# Patient Record
Sex: MALE | Race: WHITE | NOT HISPANIC OR LATINO | Employment: FULL TIME | URBAN - METROPOLITAN AREA
[De-identification: names, ages, dates, MRNs, and addresses within clinical notes are randomized per-mention and may not be internally consistent; named-entity substitution may affect disease eponyms.]

---

## 2023-02-20 PROBLEM — F12.20 TETRAHYDROCANNABINOL (THC) DEPENDENCE (MULTI): Status: ACTIVE | Noted: 2023-02-20

## 2023-02-20 PROBLEM — F32.A DEPRESSION: Status: ACTIVE | Noted: 2023-02-20

## 2023-02-20 PROBLEM — F11.20 OPIOID DEPENDENCE ON AGONIST THERAPY (MULTI): Status: ACTIVE | Noted: 2020-02-12

## 2023-02-20 PROBLEM — G43.909 MIGRAINE HEADACHE: Status: ACTIVE | Noted: 2020-02-12

## 2023-02-20 PROBLEM — F90.0 ATTENTION DEFICIT HYPERACTIVITY DISORDER, PREDOMINANTLY INATTENTIVE TYPE: Status: ACTIVE | Noted: 2020-02-12

## 2023-02-20 PROBLEM — F17.200 NICOTINE DEPENDENCE: Status: ACTIVE | Noted: 2023-02-20

## 2023-02-20 RX ORDER — BUPRENORPHINE AND NALOXONE 8; 2 MG/1; MG/1
FILM, SOLUBLE BUCCAL; SUBLINGUAL
COMMUNITY
Start: 2020-02-12 | End: 2023-03-15 | Stop reason: SDUPTHER

## 2023-02-20 RX ORDER — NALOXONE HYDROCHLORIDE 4 MG/.1ML
4 SPRAY NASAL
COMMUNITY
Start: 2022-01-26

## 2023-02-20 RX ORDER — DEXTROAMPHETAMINE SACCHARATE, AMPHETAMINE ASPARTATE MONOHYDRATE, DEXTROAMPHETAMINE SULFATE AND AMPHETAMINE SULFATE 7.5; 7.5; 7.5; 7.5 MG/1; MG/1; MG/1; MG/1
30 CAPSULE, EXTENDED RELEASE ORAL
COMMUNITY
Start: 2020-08-04

## 2023-02-20 RX ORDER — SUMATRIPTAN SUCCINATE 100 MG/1
100 TABLET ORAL
COMMUNITY
Start: 2014-12-02

## 2023-03-15 ENCOUNTER — OFFICE VISIT (OUTPATIENT)
Dept: PRIMARY CARE | Facility: CLINIC | Age: 33
End: 2023-03-15
Payer: COMMERCIAL

## 2023-03-15 ENCOUNTER — TELEPHONE (OUTPATIENT)
Dept: PRIMARY CARE | Facility: CLINIC | Age: 33
End: 2023-03-15

## 2023-03-15 VITALS
HEIGHT: 73 IN | DIASTOLIC BLOOD PRESSURE: 78 MMHG | WEIGHT: 190 LBS | SYSTOLIC BLOOD PRESSURE: 137 MMHG | BODY MASS INDEX: 25.18 KG/M2

## 2023-03-15 DIAGNOSIS — F11.20 OPIOID DEPENDENCE ON AGONIST THERAPY (MULTI): ICD-10-CM

## 2023-03-15 DIAGNOSIS — F12.20 TETRAHYDROCANNABINOL (THC) DEPENDENCE (MULTI): Primary | ICD-10-CM

## 2023-03-15 LAB
POC AMPHETAMINE: POSITIVE
POC BARBITURATES: NEGATIVE
POC BENZODIAZEPINES: NEGATIVE
POC COCAINE: NEGATIVE
POC METHADONE MANUALLY ENTERED: NEGATIVE
POC METHAMPHETAMINE: NEGATIVE
POC OPIATES: NEGATIVE
POC OXYCODONE: NEGATIVE
POC PHENCYCLIDINE (PCP): NEGATIVE
POC THC: POSITIVE
POC TICYCLIC ANTIDEPRESSANTS (TCA): NEGATIVE

## 2023-03-15 PROCEDURE — 80305 DRUG TEST PRSMV DIR OPT OBS: CPT | Performed by: FAMILY MEDICINE

## 2023-03-15 PROCEDURE — 99214 OFFICE O/P EST MOD 30 MIN: CPT | Performed by: FAMILY MEDICINE

## 2023-03-15 RX ORDER — BUPRENORPHINE AND NALOXONE 8; 2 MG/1; MG/1
FILM, SOLUBLE BUCCAL; SUBLINGUAL
Qty: 12 FILM | Refills: 0 | Status: SHIPPED | OUTPATIENT
Start: 2023-03-15 | End: 2023-03-15 | Stop reason: SDUPTHER

## 2023-03-15 RX ORDER — BUPRENORPHINE AND NALOXONE 8; 2 MG/1; MG/1
FILM, SOLUBLE BUCCAL; SUBLINGUAL
Qty: 12 FILM | Refills: 0 | Status: SHIPPED | OUTPATIENT
Start: 2023-03-15 | End: 2023-04-12 | Stop reason: SDUPTHER

## 2023-03-15 NOTE — PROGRESS NOTES
"Subjective   Patient ID: Juan Pablo Lakhani is a 33 y.o. male who presents for opioid dependency (uds).    HPI   Pt felt functional at daily activities with buprenorphine tx. Pt denies having cravings for opioids or withdrawals symptoms. Pt denies abusing any opioids. Pt has been attending NA/AA meeting or counseling. No eoth use. No HA, constipation, imbalance or confusion. No confusion, sweating, agitation. No trouble falling or staying asleep. Pt would have cravings without buprenorphine treatment. Pt denies depressed mood. No HI/SI.    Review of Systems    Objective   Ht 1.854 m (6' 1\")   Wt 86.2 kg (190 lb)   BMI 25.07 kg/m²     Physical Exam  no acute distress, well groomed, eyes: no sclera icterus, normal pupil size, neck is supple, no exertional respiration, Lungs: CTA bilaterally, heart: RRR, abdomen: soft, no tenderness, BS+, normal balance, good judgment, memory and insight, good mood. Normal affect. No HI/SI or paranoia    Assessment/Plan     Opioid dependence, Pt denies cravings and withdrawals. Pt denies any opioid abuse. Pt felt functional with current buprenorphine tx.    I personally reviewed pt's urine drug screen performed in office today.  The urine drug screen was negative for opiate but + for and amp, thc and bup.   I reviewed pt's updated OARRS report today.  The OARRS report showed that pt filled buprenorphine as prescribed. Caution patient that transmucosal buprenorphine can increase the risk of dental problems, including tooth decay, cavities, oral infections and loss of teeth. Recommend  the pt to see a dentist for dental exam twice a year. Recommend pt to gently rinse teeth and gums with water after the medicine has completely dissolved.  Wait at least 1 hour before brushing teeth. Informed pt of the alternative treatment for opioid dependence such as Probuphine implant, Sublocade injection, Vivitrol shot, oral naltrexone pills and in-patient rehabilitation. Pt prefers to cont current dose " of buprenorphine treatment.   Pt understood to avoid  benzodiazepine if possible and alcohol which can interact with buprenorphine causing respiratory suppression and potential death. Pt understood that buprenorphine is addictive. Caution that buprenorphine diversion is against the law. Recommend pt to keep buprenorphine out of reach of children. Recommend to keep naloxone available in case of opioid OD. Encourage counseling for opioid dependence. Pt should not drive or operate machinery if incoordination or confusion occurs. I have considered the risks of abuse, dependence, addiction and diversion. I believe that it is clinically appropriate for the pt to continue current buprenorphine treatment. RTC as scheduled.   Dc marijuana use

## 2023-04-12 ENCOUNTER — OFFICE VISIT (OUTPATIENT)
Dept: PRIMARY CARE | Facility: CLINIC | Age: 33
End: 2023-04-12
Payer: COMMERCIAL

## 2023-04-12 VITALS — DIASTOLIC BLOOD PRESSURE: 68 MMHG | SYSTOLIC BLOOD PRESSURE: 123 MMHG

## 2023-04-12 DIAGNOSIS — F11.20 OPIOID DEPENDENCE ON AGONIST THERAPY (MULTI): ICD-10-CM

## 2023-04-12 DIAGNOSIS — F12.20 TETRAHYDROCANNABINOL (THC) DEPENDENCE (MULTI): Primary | ICD-10-CM

## 2023-04-12 LAB
POC AMPHETAMINE: NEGATIVE NG/ML
POC BARBITURATES: NEGATIVE NG/ML
POC BENZODIAZEPINES: NEGATIVE NG/ML
POC BUPRENORPHINE URINE: POSITIVE NG/ML
POC COCAINE: NEGATIVE NG/ML
POC MDMA (NG/ML) IN URINE: NEGATIVE NG/ML
POC METHADONE MANUALLY ENTERED: NEGATIVE NG/ML
POC METHAMPHETAMINE: NEGATIVE NG/ML
POC MORPHINE URINE: NEGATIVE NG/ML
POC OPIATES: NEGATIVE NG/ML
POC OXYCODONE: NEGATIVE NG/ML
POC PHENCYCLIDINE (PCP): NEGATIVE NG/ML
POC THC: POSITIVE NG/ML

## 2023-04-12 PROCEDURE — 80346 BENZODIAZEPINES1-12: CPT

## 2023-04-12 PROCEDURE — 80307 DRUG TEST PRSMV CHEM ANLYZR: CPT

## 2023-04-12 PROCEDURE — 80349 CANNABINOIDS NATURAL: CPT

## 2023-04-12 PROCEDURE — 80354 DRUG SCREENING FENTANYL: CPT

## 2023-04-12 PROCEDURE — 80305 DRUG TEST PRSMV DIR OPT OBS: CPT | Performed by: FAMILY MEDICINE

## 2023-04-12 PROCEDURE — 99214 OFFICE O/P EST MOD 30 MIN: CPT | Performed by: FAMILY MEDICINE

## 2023-04-12 PROCEDURE — 80358 DRUG SCREENING METHADONE: CPT

## 2023-04-12 PROCEDURE — 80348 DRUG SCREENING BUPRENORPHINE: CPT

## 2023-04-12 PROCEDURE — 80373 DRUG SCREENING TRAMADOL: CPT

## 2023-04-12 PROCEDURE — 80361 OPIATES 1 OR MORE: CPT

## 2023-04-12 PROCEDURE — 80365 DRUG SCREENING OXYCODONE: CPT

## 2023-04-12 PROCEDURE — 80368 SEDATIVE HYPNOTICS: CPT

## 2023-04-12 RX ORDER — BUPRENORPHINE AND NALOXONE 8; 2 MG/1; MG/1
FILM, SOLUBLE BUCCAL; SUBLINGUAL
Qty: 12 FILM | Refills: 0 | Status: SHIPPED | OUTPATIENT
Start: 2023-04-12 | End: 2023-05-08 | Stop reason: SDUPTHER

## 2023-04-12 NOTE — PROGRESS NOTES
Subjective   Patient ID: Juan Pablo Lakhani is a 33 y.o. male who presents for opioid dependency (Uds, bup, uds2).    HPI   Pt denies having cravings for opioids or withdrawals symptoms. Pt denies abusing any opioids.  Pt felt functional at daily activities with buprenorphine tx. Pt has been attending NA/AA meeting or counseling. Pt denies  eoth use. No HA, constipation, imbalance, confusion, sweating, agitation. No insomnia. Pt would have cravings without buprenorphine treatment. Pt denies depressed mood.     Review of Systems    Objective   There were no vitals taken for this visit.    Physical Exam  Well groomed, eyes: no sclera icterus, normal pupil size, no exertional respiration, Lungs: CTA bilaterally, heart: RRR, abdomen: soft, no tenderness, BS+, normal balance, good judgment, good mood. Normal affect. No HI/SI     Assessment/Plan        Opioid dependence, Pt denies cravings and withdrawals. Pt denies any opioid abuse.   I personally reviewed pt's urine drug screen performed in office today.  The urine drug screen was negative for opiate but + for thc and  bup.   I reviewed pt's updated OARRS report today.  The OARRS report showed no physician shopping. Caution patient that transmucosal buprenorphine can increase the risk of tooth decay, cavities, oral infections and loss of teeth. Alternative treatment for opioid dependence such as Probuphine implant, Sublocade injection are available.  Pt understood that buprenorphine is addictive. Caution that buprenorphine diversion is against the law. Recommend pt to keep buprenorphine out of reach of children. Recommend to keep naloxone available in case of opioid OD. Encourage counseling for opioid dependence. Pt should not drive or operate machinery if incoordination or confusion occurs. I have considered the risks of abuse, dependence, addiction and diversion. I believe that it is clinically appropriate for the pt to continue current buprenorphine treatment. RTC as  scheduled.   Dc smoking marijuana

## 2023-04-14 LAB
6-ACETYLMORPHINE: <25 NG/ML
7-AMINOCLONAZEPAM: <25 NG/ML
ALPHA-HYDROXYALPRAZOLAM: <25 NG/ML
ALPHA-HYDROXYMIDAZOLAM: <25 NG/ML
ALPRAZOLAM: <25 NG/ML
AMPHETAMINE (PRESENCE) IN URINE BY SCREEN METHOD: ABNORMAL
BARBITURATES PRESENCE IN URINE BY SCREEN METHOD: ABNORMAL
CANNABINOIDS IN URINE BY SCREEN METHOD: ABNORMAL
CHLORDIAZEPOXIDE: <25 NG/ML
CLONAZEPAM: <25 NG/ML
COCAINE (PRESENCE) IN URINE BY SCREEN METHOD: ABNORMAL
CODEINE: <50 NG/ML
CREATINE, URINE FOR DRUG: 519.4 MG/DL
DIAZEPAM: <25 NG/ML
DRUG SCREEN COMMENT URINE: ABNORMAL
EDDP: <25 NG/ML
FENTANYL CONFIRMATION, URINE: <2.5 NG/ML
HYDROCODONE: <25 NG/ML
HYDROMORPHONE: <25 NG/ML
LORAZEPAM: <25 NG/ML
METHADONE CONFIRMATION,URINE: <25 NG/ML
MIDAZOLAM: <25 NG/ML
MORPHINE URINE: <50 NG/ML
NORDIAZEPAM: <25 NG/ML
NORFENTANYL: <2.5 NG/ML
NORHYDROCODONE: <25 NG/ML
NOROXYCODONE: <25 NG/ML
O-DESMETHYLTRAMADOL: <50 NG/ML
OXAZEPAM: <25 NG/ML
OXYCODONE: <25 NG/ML
OXYMORPHONE: <25 NG/ML
PHENCYCLIDINE (PRESENCE) IN URINE BY SCREEN METHOD: ABNORMAL
TEMAZEPAM: <25 NG/ML
TRAMADOL: <50 NG/ML
ZOLPIDEM METABOLITE (ZCA): <25 NG/ML
ZOLPIDEM: <25 NG/ML

## 2023-04-17 LAB — 11-NOR-9-CARBOXY-THC, URN, QUANT: >500 NG/ML

## 2023-04-19 LAB
BUPRENORPHINE ,URINE: <2 NG/ML
BUPRENORPHINE GLUC, URINE: 414 NG/ML
NALOXONE, URINE: <100 NG/ML
NORBUPRENORPHINE GLUC,URINE: 305 NG/ML
NORBUPRENORPHINE, URINE: 115 NG/ML

## 2023-05-08 ENCOUNTER — OFFICE VISIT (OUTPATIENT)
Dept: PRIMARY CARE | Facility: CLINIC | Age: 33
End: 2023-05-08
Payer: COMMERCIAL

## 2023-05-08 VITALS — HEART RATE: 76 BPM | DIASTOLIC BLOOD PRESSURE: 77 MMHG | SYSTOLIC BLOOD PRESSURE: 132 MMHG

## 2023-05-08 DIAGNOSIS — F11.20 OPIOID DEPENDENCE ON AGONIST THERAPY (MULTI): ICD-10-CM

## 2023-05-08 DIAGNOSIS — F12.20 TETRAHYDROCANNABINOL (THC) DEPENDENCE (MULTI): Primary | ICD-10-CM

## 2023-05-08 PROCEDURE — 1036F TOBACCO NON-USER: CPT | Performed by: FAMILY MEDICINE

## 2023-05-08 PROCEDURE — 99214 OFFICE O/P EST MOD 30 MIN: CPT | Performed by: FAMILY MEDICINE

## 2023-05-08 PROCEDURE — 80305 DRUG TEST PRSMV DIR OPT OBS: CPT | Performed by: FAMILY MEDICINE

## 2023-05-08 RX ORDER — BUPRENORPHINE AND NALOXONE 8; 2 MG/1; MG/1
FILM, SOLUBLE BUCCAL; SUBLINGUAL
Qty: 12 FILM | Refills: 0 | Status: SHIPPED | OUTPATIENT
Start: 2023-05-08 | End: 2023-06-05 | Stop reason: SDUPTHER

## 2023-05-08 NOTE — PROGRESS NOTES
Subjective   Patient ID: Juan Pablo Lakhani is a 33 y.o. male who presents for OPIOID DEPENDENCY (UDS).    HPI   Pt felt functional at daily activities with buprenorphine tx. Pt denies having cravings for opioids or withdrawals symptoms. Pt denies abusing any opioids. Pt has been attending NA/AA meeting or counseling. No eoth use. No HA, constipation, imbalance or confusion. No trouble falling or staying asleep. Pt would have cravings and withdrawals without buprenorphine treatment. No depressed mood. No HI/SI.     Review of Systems    Objective   /77   Pulse 76     Physical Exam  No acute distress, well groomed, eyes: no sclera icterus, normal pupil size, no exertional respiration, Lungs: CTA bilaterally, heart: RRR, abdomen: soft, no tenderness, BS+, normal balance, good judgment, memory and insight, good mood. Normal affect. No HI/SI or paranoia    Assessment/Plan     Opioid dependence, Pt denies cravings and withdrawals. Pt denies any opioid abuse. Pt felt functional with buprenorphine tx.    I personally reviewed pt's urine drug screen performed in office today.  The urine drug screen was + for thc and amp, buprenorphine but negative for opiate. I reviewed pt's updated OARRS report today.  The OARRS report showed that pt filled buprenorphine as prescribed. Pt has not filled other opioid medications recently. Recommend the pt to see a dentist for dental exam twice a year while on buprenorphine. Recommend pt to gently rinse teeth and gums with water after the medicine has completely dissolved.  Informed pt of the alternative medication treatment for opioid dependence such as Probuphine implant, SUBLOCADE injection, vivitrol shot, oral naltrexone pills. Pt prefers to continue current of buprenorphine treatment. Pt understood to avoid alcohol and benzodiazepine which can interact with buprenorphine causing respiratory suppression and potential death. Pt understood that buprenorphine is addictive. Caution that  buprenorphine diversion is against the law. Recommend pt to keep buprenorphine out of reach of children. Recommend to keep naloxone available in case of opioid OD. Encourage counseling for opioid dependence. Pt should not drive or operate machinery if incoordination or confusion occurs. I have considered the risks of abuse, dependence, addiction and diversion. I believe that it is clinically appropriate for the pt to continue current buprenorphine treatment. RTC as scheduled.   Dc smoking marijuana

## 2023-05-09 ENCOUNTER — TELEPHONE (OUTPATIENT)
Dept: PRIMARY CARE | Facility: CLINIC | Age: 33
End: 2023-05-09

## 2023-05-09 LAB
POC AMPHETAMINE: POSITIVE NG/ML
POC BARBITURATES: NEGATIVE NG/ML
POC BENZODIAZEPINES: NEGATIVE NG/ML
POC BUPRENORPHINE URINE: POSITIVE NG/ML
POC COCAINE: NEGATIVE NG/ML
POC MDMA (NG/ML) IN URINE: NEGATIVE NG/ML
POC METHADONE MANUALLY ENTERED: NEGATIVE NG/ML
POC METHAMPHETAMINE: NEGATIVE NG/ML
POC MORPHINE URINE: NEGATIVE NG/ML
POC OPIATES: NEGATIVE NG/ML
POC OXYCODONE: NEGATIVE NG/ML
POC PHENCYCLIDINE (PCP): NEGATIVE NG/ML
POC THC: POSITIVE NG/ML

## 2023-05-09 NOTE — TELEPHONE ENCOUNTER
Pt called - states that only 12 strips of suboxone were sent to the pharmacy and it should have been 13.

## 2023-06-05 ENCOUNTER — OFFICE VISIT (OUTPATIENT)
Dept: PRIMARY CARE | Facility: CLINIC | Age: 33
End: 2023-06-05
Payer: COMMERCIAL

## 2023-06-05 VITALS — SYSTOLIC BLOOD PRESSURE: 129 MMHG | DIASTOLIC BLOOD PRESSURE: 77 MMHG

## 2023-06-05 DIAGNOSIS — F12.20 TETRAHYDROCANNABINOL (THC) DEPENDENCE (MULTI): ICD-10-CM

## 2023-06-05 DIAGNOSIS — F11.20 OPIOID DEPENDENCE ON AGONIST THERAPY (MULTI): Primary | ICD-10-CM

## 2023-06-05 LAB
POC AMPHETAMINE: POSITIVE NG/ML
POC COCAINE: NEGATIVE NG/ML
POC METHAMPHETAMINE: NEGATIVE NG/ML
POC OPIATES: NEGATIVE NG/ML
POC THC: POSITIVE NG/ML

## 2023-06-05 PROCEDURE — 80305 DRUG TEST PRSMV DIR OPT OBS: CPT | Performed by: FAMILY MEDICINE

## 2023-06-05 PROCEDURE — 99214 OFFICE O/P EST MOD 30 MIN: CPT | Performed by: FAMILY MEDICINE

## 2023-06-05 PROCEDURE — 1036F TOBACCO NON-USER: CPT | Performed by: FAMILY MEDICINE

## 2023-06-05 RX ORDER — BUPRENORPHINE AND NALOXONE 8; 2 MG/1; MG/1
FILM, SOLUBLE BUCCAL; SUBLINGUAL
Qty: 12 FILM | Refills: 0 | Status: SHIPPED | OUTPATIENT
Start: 2023-06-05 | End: 2023-06-30 | Stop reason: SDUPTHER

## 2023-06-05 NOTE — PROGRESS NOTES
Subjective   Patient ID: Juan Pablo Lakhani is a 33 y.o. male who presents for opioid dependence    HPI   Pt stated that current dose of buprenorphine controlled cravings for opioids and withdrawals symptoms well. Pt feels normal and performs daily activities well.  Pt denies abusing any opioids. pt has social support and has been having counseling. pt denies drinking alcohol or taking benzodiazepine. No SE from buprenorphine  such as HA, constipation, imbalance or confusion. Pt sleeps well most night. Pt would have cravings and withdrawals without buprenorphine treatment. Pt denies depressed mood. No HI/SI.     Review of Systems    Objective   There were no vitals taken for this visit.    Physical Exam  A&Ox3, No acute distress, well groomed, eyes: no sclera icterus, no exertional respiration, Lungs: CTA bilaterally, heart: RRR, abdomen: soft, no tenderness, BS+, normal station and gait, good  mood     Assessment/Plan     Opioid dependence, Pt stated that current dose of buprenorphine treatment controlled cravings and withdrawals well. Pt denies any opioid abuse.   I personally reviewed pt's urine drug screen done in office today.  The urine drug screen was negative for opiate but + for thc and amp. I reviewed pt's updated OARRS report today.  The OARRS report showed that pt filled buprenorphine consistently as prescribed. Caution patient that alcohol and benzo should be avoided while on buprenorphine. Pt understood that buprenorphine is addictive. Lending meds is illegal. Recommend pt to lock  buprenorphine in a safe place and  out of reach of children. Recommend to keep naloxone available in case of opioid OD. Cont counseling for opioid dependence. Pt should not drive or operate machinery if incoordination or confusion occurs. I have considered the risks of abuse, dependence, addiction and diversion. I believe that it is clinically appropriate for the pt to continue current buprenorphine treatment. Continue current  dose of buprenorphine. RTC as scheduled  Dc using marijuana

## 2023-06-30 ENCOUNTER — OFFICE VISIT (OUTPATIENT)
Dept: PRIMARY CARE | Facility: CLINIC | Age: 33
End: 2023-06-30
Payer: COMMERCIAL

## 2023-06-30 VITALS — SYSTOLIC BLOOD PRESSURE: 125 MMHG | DIASTOLIC BLOOD PRESSURE: 70 MMHG

## 2023-06-30 DIAGNOSIS — F11.20 OPIOID DEPENDENCE ON AGONIST THERAPY (MULTI): Primary | ICD-10-CM

## 2023-06-30 PROCEDURE — 1036F TOBACCO NON-USER: CPT | Performed by: FAMILY MEDICINE

## 2023-06-30 PROCEDURE — 99213 OFFICE O/P EST LOW 20 MIN: CPT | Performed by: FAMILY MEDICINE

## 2023-06-30 RX ORDER — BUPRENORPHINE AND NALOXONE 8; 2 MG/1; MG/1
FILM, SOLUBLE BUCCAL; SUBLINGUAL
Qty: 12 FILM | Refills: 0 | Status: SHIPPED | OUTPATIENT
Start: 2023-07-01 | End: 2023-07-28 | Stop reason: SDUPTHER

## 2023-06-30 NOTE — PROGRESS NOTES
Subjective   Patient ID: Juan Pablo Lakhani is a 33 y.o. male who presents for Opioid dependence treatment    HPI   Cravings for opioids and withdrawals symptoms were controlled with current dose of buprenorphine tx. Pt denies abusing any opioids. Patient has been attending meeting/counseling as recommended. Pt felt normal and functional with buprenorphine tx. Pt was able to maintain good relationship with other people.  Pt had good family  support. Pt denies drinking eoth. No headache, constipation, imbalance, insomnia, mental cloudiness, sweating, drowsiness or confusion. Good appetite. Pt would have cravings and withdrawals without buprenorphine treatment. pt denies having depressed mood.   Review of Systems    Objective   /70     Physical Exam  No acute distress, well groomed, eyes: normal pupil size, no sclera icterus, Lungs: CTA bilaterally, heart: RRR, abdomen: soft, no tenderness, BS+, no imbalance. Normal mood   Assessment/Plan     Opioid dependence, pt feels functional at daily activities while on buprenorphine treatment. Pt has been attending counseling/NA/AA meeting for opioid dependence. Pt denies any opioid abuse. Cravings and withdrawals were controlled.  reviewed pt's OARRS report today. The OARRS report showed pt filled medications as prescribed.   Inform pt to avoid  alcohol  while on buprenorphine tx. I have considered the risks of abuse, dependence, addiction and diversion. I believe that it is clinically appropriate for the pt to continue current buprenorphine treatment. Continue counseling for opioid dependence. Recommend pt to keep  buprenorphine in a secure place and out of reach of children. Inform pt that lending buprenorphine is illegal. Recommend to keep narcan available in case of opioid OD. Pt should not drive or operate machinery if incoordination or confusion occurs. f/u as scheduled

## 2023-07-28 ENCOUNTER — OFFICE VISIT (OUTPATIENT)
Dept: PRIMARY CARE | Facility: CLINIC | Age: 33
End: 2023-07-28
Payer: COMMERCIAL

## 2023-07-28 VITALS — DIASTOLIC BLOOD PRESSURE: 78 MMHG | SYSTOLIC BLOOD PRESSURE: 135 MMHG | HEART RATE: 82 BPM

## 2023-07-28 DIAGNOSIS — F11.20 OPIOID DEPENDENCE ON AGONIST THERAPY (MULTI): Primary | ICD-10-CM

## 2023-07-28 DIAGNOSIS — F12.20 TETRAHYDROCANNABINOL (THC) DEPENDENCE (MULTI): ICD-10-CM

## 2023-07-28 PROCEDURE — 80305 DRUG TEST PRSMV DIR OPT OBS: CPT | Performed by: FAMILY MEDICINE

## 2023-07-28 PROCEDURE — 99214 OFFICE O/P EST MOD 30 MIN: CPT | Performed by: FAMILY MEDICINE

## 2023-07-28 PROCEDURE — 1036F TOBACCO NON-USER: CPT | Performed by: FAMILY MEDICINE

## 2023-07-28 RX ORDER — BUPRENORPHINE AND NALOXONE 8; 2 MG/1; MG/1
FILM, SOLUBLE BUCCAL; SUBLINGUAL
Qty: 12 FILM | Refills: 0 | Status: SHIPPED | OUTPATIENT
Start: 2023-07-29 | End: 2023-08-25

## 2023-07-28 NOTE — PROGRESS NOTES
Subjective   Patient ID: Juan Pablo Lakhani is a 33 y.o. male who presents for Opioid dependence treatment    HPI   Pt denies cravings for opioids and withdrawals symptoms with current dose of buprenorphine tx. Pt denies abusing any opioids. Patient has been attending meeting/counseling as recommended. Pt felt  functional with buprenorphine tx. Pt had good support. Pt denies drinking eoth. No headache, constipation, insomnia  or confusion. Good appetite. Pt would have cravings and withdrawals without buprenorphine treatment. pt denies having depressed mood.     Review of Systems    Objective   /78   Pulse 82     Physical Exam  No acute distress, well groomed, eyes: normal pupil size,  Lungs: CTA bilaterally, heart: RRR, abdomen: soft, no tenderness, BS+, good balance. good mood     Assessment/Plan        Opioid dependence, Pt denies any opioid abuse. Cravings and withdrawals were controlled.  Pt has been attending counseling/NA/AA meeting for opioid dependence.  I reviewed pt's OARRS report today. The OARRS report showed pt filled medications as prescribed. I reviewed pt's UDS done in office today. It was negative for opioid but + for amp and thc.  Inform pt to avoid  alcohol  while on buprenorphine tx. I have considered the risks of abuse, dependence, addiction and diversion. I believe that it is clinically appropriate for the pt to continue current buprenorphine treatment. Continue counseling for opioid dependence. Inform pt that lending buprenorphine is illegal. Recommend to keep narcan available in case of opioid OD. Pt should not drive or operate machinery if incoordination or confusion occurs. f/u as scheduled  Dc marijuana use

## 2023-08-25 ENCOUNTER — OFFICE VISIT (OUTPATIENT)
Dept: PRIMARY CARE | Facility: CLINIC | Age: 33
End: 2023-08-25
Payer: COMMERCIAL

## 2023-08-25 DIAGNOSIS — F11.20 OPIOID DEPENDENCE ON AGONIST THERAPY (MULTI): Primary | ICD-10-CM

## 2023-08-25 PROCEDURE — 1036F TOBACCO NON-USER: CPT | Performed by: FAMILY MEDICINE

## 2023-08-25 PROCEDURE — 99213 OFFICE O/P EST LOW 20 MIN: CPT | Performed by: FAMILY MEDICINE

## 2023-08-29 VITALS — SYSTOLIC BLOOD PRESSURE: 132 MMHG | DIASTOLIC BLOOD PRESSURE: 76 MMHG

## 2023-08-29 NOTE — PROGRESS NOTES
Subjective   Patient ID: Juan Pablo Lakhani is a 33 y.o. male who presents for Opioid dependence treatment    HPI   Pt stated that current dose of buprenorphine controlled cravings for opioids and withdrawals symptoms well. Pt feels normal and performs daily activities well.  Pt denies abusing any opioids. pt has social support and has been having counseling. pt denies drinking alcohol. No SE from buprenorphine  such as HA, constipation, imbalance or confusion. Pt sleeps well most night. Pt would have cravings and withdrawals without buprenorphine treatment. Pt denies depressed mood. No HI/SI.     Review of Systems    Objective   /76     Physical Exam  No acute distress, well groomed, eyes: no sclera icterus, no exertional respiration, Lungs: CTA bilaterally, heart: RRR, abdomen: soft, no tenderness, BS+, normal station and gait, normal mood and affect. No HI/SI     Assessment/Plan     Opioid dependence, Pt stated that current dose of buprenorphine treatment controlled cravings and withdrawals well. Pt denies any opioid abuse.  Caution patient that alcohol  should be avoided while on buprenorphine. Pt understood that buprenorphine is addictive. Inform pt that lending meds is illegal. Recommend pt to lock  buprenorphine in a safe place and  out of reach of children. Recommend to keep naloxone available in case of opioid OD. Cont counseling for opioid dependence. Pt should not drive or operate machinery if incoordination or confusion occurs. I have considered the risks of abuse, dependence, addiction and diversion. I believe that it is clinically appropriate for the pt to continue current buprenorphine treatment. RTC as scheduled

## 2023-09-22 ENCOUNTER — OFFICE VISIT (OUTPATIENT)
Dept: PRIMARY CARE | Facility: CLINIC | Age: 33
End: 2023-09-22

## 2023-09-22 VITALS — DIASTOLIC BLOOD PRESSURE: 76 MMHG | SYSTOLIC BLOOD PRESSURE: 132 MMHG

## 2023-09-22 DIAGNOSIS — F12.20 TETRAHYDROCANNABINOL (THC) DEPENDENCE (MULTI): ICD-10-CM

## 2023-09-22 DIAGNOSIS — F11.20 OPIOID DEPENDENCE ON AGONIST THERAPY (MULTI): Primary | ICD-10-CM

## 2023-09-22 PROCEDURE — 80305 DRUG TEST PRSMV DIR OPT OBS: CPT | Performed by: FAMILY MEDICINE

## 2023-09-22 PROCEDURE — 1036F TOBACCO NON-USER: CPT | Performed by: FAMILY MEDICINE

## 2023-09-22 PROCEDURE — 99214 OFFICE O/P EST MOD 30 MIN: CPT | Performed by: FAMILY MEDICINE

## 2023-09-22 RX ORDER — BUPRENORPHINE AND NALOXONE 8; 2 MG/1; MG/1
FILM, SOLUBLE BUCCAL; SUBLINGUAL
COMMUNITY
Start: 2023-08-25 | End: 2023-09-22 | Stop reason: SDUPTHER

## 2023-09-22 RX ORDER — BUPRENORPHINE AND NALOXONE 8; 2 MG/1; MG/1
FILM, SOLUBLE BUCCAL; SUBLINGUAL
Qty: 12 FILM | Refills: 0 | Status: SHIPPED | OUTPATIENT
Start: 2023-09-23 | End: 2023-10-20 | Stop reason: SDUPTHER

## 2023-09-22 NOTE — PROGRESS NOTES
Subjective   Patient ID: Juan Pablo Lakhani is a 33 y.o. male who presents for Opioid dependence treatment      HPI   pt denies cravings for opioids and withdrawals symptoms with buprenorphine tx. pt felt functional at daily activities. No headache, confusion, drowsiness, imbalance, insomnia or constipation. Pt denies abusing any opioids.  Pt denies drinking eoth.  pt has been attending counseling and/or NA/AA meeting. Pt has good family support. Pt would have cravings and withdrawals without buprenorphine treatment. pt denies depressed mood, mood swings or HI/SI.     Review of Systems    Objective   /76     Physical Exam  No acute distress, well groomed, eyes: normal pupil size, no sclera icterus, no nasal discharge, no exertional respiration, Lungs: CTA bilaterally, heart: RRR, abdomen: soft, no tenderness, BS+, good balance, good memory and judgment. No depressed mood     Assessment/Plan     Opioid dependence, Pt denies any opioid abuse. Cravings and withdrawals were controlled.     I personally reviewed pt's urine drug screen test today.  The urine drug screen was negative for opiate but + for thc and amp. I reviewed pt's OARRS report today. The OARRS report showed pt filled meds as prescribed.  No physician shopping.   Inform pt that counseling and/or NA/AA meeting are mandatory for the buprenorphine treatment program.  Inform pt that buprenorphine is addictive and that buprenorphine diversion is against the law. Pt was familiar to the signs and symptoms of opioid overdose and will call 911 in an overdose situation. Pt has narcan readily available in case of opioid OD. Pt agreed to ask for a new prescription for narcan upon expiration or use of the old narcan kit. Continue counseling and  buprenorphine tx for opioid dependence. Recommend pt to lock  buprenorphine in a secure  location. I have considered the risks of abuse, dependence, addiction and diversion. I believe that it is clinically appropriate for  the pt to continue current buprenorphine treatment. f/u as scheduled  Dc marijuana use

## 2023-10-20 ENCOUNTER — OFFICE VISIT (OUTPATIENT)
Dept: PRIMARY CARE | Facility: CLINIC | Age: 33
End: 2023-10-20

## 2023-10-20 VITALS — DIASTOLIC BLOOD PRESSURE: 69 MMHG | SYSTOLIC BLOOD PRESSURE: 127 MMHG

## 2023-10-20 DIAGNOSIS — F11.20 OPIOID DEPENDENCE ON AGONIST THERAPY (MULTI): ICD-10-CM

## 2023-10-20 PROCEDURE — 90471 IMMUNIZATION ADMIN: CPT | Performed by: FAMILY MEDICINE

## 2023-10-20 PROCEDURE — 99213 OFFICE O/P EST LOW 20 MIN: CPT | Performed by: FAMILY MEDICINE

## 2023-10-20 PROCEDURE — 90686 IIV4 VACC NO PRSV 0.5 ML IM: CPT | Performed by: FAMILY MEDICINE

## 2023-10-20 PROCEDURE — 1036F TOBACCO NON-USER: CPT | Performed by: FAMILY MEDICINE

## 2023-10-20 RX ORDER — BUPRENORPHINE AND NALOXONE 8; 2 MG/1; MG/1
FILM, SOLUBLE BUCCAL; SUBLINGUAL
Qty: 12 FILM | Refills: 0 | Status: SHIPPED | OUTPATIENT
Start: 2023-10-21 | End: 2023-10-20 | Stop reason: SDUPTHER

## 2023-10-20 RX ORDER — BUPRENORPHINE AND NALOXONE 8; 2 MG/1; MG/1
FILM, SOLUBLE BUCCAL; SUBLINGUAL
Qty: 12 FILM | Refills: 0 | Status: SHIPPED | OUTPATIENT
Start: 2023-10-20 | End: 2023-11-16 | Stop reason: SDUPTHER

## 2023-10-20 NOTE — PROGRESS NOTES
Subjective   Patient ID: Juan Pablo Lakhani is a 33 y.o. male who presents for Opioid dependence treatment    HPI   Pt stated that cravings for opioids and withdrawals symptoms were controlled with buprenorphine. Pt has been taking buprenorphine as instructed. Pt denies abusing any opioids.  Pt sleeps well most nights. pt has been attending counseling and/or NA/AA meeting. Pt denies drinking eoth. Pt denies having HA, constipation, imbalance or confusion. Pt denies mental cloudiness, drowsiness.  Pt felt functional while on current buprenorphine treatment. Pt may abuse opioids again without buprenorphine treatment due to cravings and withdrawals. Pt denies depressed mood or HI/SI.     Review of Systems    Objective   /69     Physical Exam  No acute distress, well groomed, eyes: no sclera icterus, normal pupil size, neck is supple, Lungs: CTA bilaterally, heart: RRR, abdomen: soft, no tenderness, normal BS, good balance, good judgment and insight. Good mood and normal affect. No HI/SI     Assessment/Plan        Opioid dependence, Pt denies any opioid abuse. Cravings and withdrawals were controlled.   I reviewed pt's updated OARRS report today.  The OARRS report showed that pt filled buprenorphine as prescribed. No sign of physician shopping. The patient was informed that Eoth and benzo should be avoided while on buprenorphine. pt understood that diversion of buprenorphine is illegal. Recommend to taper down the dose of buprenorphine when pt feels comfortable. Recommend pt to keep buprenorphine in a secure place and out of reach of children. Recommend to keep naloxone kits available in case of opioid OD. Continue counseling for opioid dependence. Pt should not drive or operate machinery if incoordination or confusion occurs. I have considered the risks of abuse, dependence, addiction and diversion. I believe that it is clinically appropriate for the pt to continue buprenorphine treatment. Pt is familiar to the signs  and symptoms of opioid overdose and pt knows how to use narcan in case of emergency. f/u as scheduled   Patient is not sick today. Patient is not anxious about getting a shot today. Patient has never had Guillain Barré syndrome. Patient has never felt dizzy or faint before, during, or after a shot. Patient has never had a serious reaction to influenza vaccine in the past.  Patient has never had an allergy to an ingredient of influenza vaccine.  Flu shot was given via IM today.

## 2023-11-16 ENCOUNTER — OFFICE VISIT (OUTPATIENT)
Dept: PRIMARY CARE | Facility: CLINIC | Age: 33
End: 2023-11-16

## 2023-11-16 ENCOUNTER — TELEPHONE (OUTPATIENT)
Dept: PRIMARY CARE | Facility: CLINIC | Age: 33
End: 2023-11-16

## 2023-11-16 VITALS — SYSTOLIC BLOOD PRESSURE: 123 MMHG | DIASTOLIC BLOOD PRESSURE: 67 MMHG

## 2023-11-16 DIAGNOSIS — F11.20 OPIOID DEPENDENCE ON AGONIST THERAPY (MULTI): ICD-10-CM

## 2023-11-16 PROCEDURE — 99214 OFFICE O/P EST MOD 30 MIN: CPT | Performed by: FAMILY MEDICINE

## 2023-11-16 PROCEDURE — 1036F TOBACCO NON-USER: CPT | Performed by: FAMILY MEDICINE

## 2023-11-16 PROCEDURE — 80305 DRUG TEST PRSMV DIR OPT OBS: CPT | Performed by: FAMILY MEDICINE

## 2023-11-16 RX ORDER — BUPRENORPHINE AND NALOXONE 8; 2 MG/1; MG/1
FILM, SOLUBLE BUCCAL; SUBLINGUAL
Qty: 12 FILM | Refills: 0 | Status: SHIPPED | OUTPATIENT
Start: 2023-11-16 | End: 2023-12-14 | Stop reason: SDUPTHER

## 2023-11-16 NOTE — PROGRESS NOTES
Subjective   Patient ID: Juan Pablo Lakhani is a 33 y.o. male who presents for Opioid dependence treatment    HPI   Pt performs daily activities well with current buprenorphine treatment.  Cravings for opioids and withdrawals symptoms were controlled.  Pt denies abusing any opioids.Pt denies drinking eoth. Pt has been attending meeting or counseling for opioid dependence. Pt has been taking buprenorphine as dir. Pt denies having headache, constipation, mental cloudiness, fatigue, insomnia, drowsiness. Pt would have cravings and withdrawals without buprenorphine treatment. Pt has had good mood. No HI/SI.     Review of Systems    Objective   /67     Physical Exam  No acute distress, well groomed, eyes: normal pupil size, no sclera icterus, normal respiration effort. Lungs: CTA bilaterally, heart: RRR, abdomen: soft, no tenderness, normal BS, Good mood and normal affect. No HI/SI    Assessment/Plan     Opioid dependence, Pt denies any opioid abuse. Cravings for opioids and withdrawals were controlled.    I personally reviewed pt's urine drug screen test performed in office today.  The urine drug screen was negative for opiate but + for thc and amp. I reviewed pt's updated OARRS report.  The OARRS report showed that pt filled buprenorphine as prescribed. No opioid shopping.   Pt understood to avoid use  Eoth while on buprenorphine due to potential interactions causing death. Inform pt that buprenorphine is addictive. Inform pt that selling or giving away buprenorphine is against the law. Recommend pt to keep buprenorphine out of reach of children. Recommend to keep naloxone kits available in case of opioid OD. Recommend   counseling for opioid dependence. Pt should not drive or operate machinery if incoordination or confusion occurs. I have considered the risks of abuse, dependence, addiction and diversion. I believe that it is clinically appropriate for the pt to continue buprenorphine treatment. Caution patient that  transmucosal buprenorphine can increase the risk of tooth decay, cavities, oral infections, and loss of teeth. Recommend the pt to see a dentist for dental exam regularly. Recommend pt to gently rinse teeth and gums with water after the medicine has completely dissolved. Informed pt the alternative medical treatments for opioid dependence such as Probuphine implant, SUBLOCADE injection, vivitrol shot, in-patient rehabilitation are available. f/u as scheduled.  Dc marijuana use

## 2023-12-14 ENCOUNTER — OFFICE VISIT (OUTPATIENT)
Dept: PRIMARY CARE | Facility: CLINIC | Age: 33
End: 2023-12-14

## 2023-12-14 VITALS — DIASTOLIC BLOOD PRESSURE: 76 MMHG | SYSTOLIC BLOOD PRESSURE: 123 MMHG

## 2023-12-14 DIAGNOSIS — F11.20 OPIOID DEPENDENCE ON AGONIST THERAPY (MULTI): ICD-10-CM

## 2023-12-14 PROCEDURE — 99214 OFFICE O/P EST MOD 30 MIN: CPT | Performed by: FAMILY MEDICINE

## 2023-12-14 PROCEDURE — 80305 DRUG TEST PRSMV DIR OPT OBS: CPT | Performed by: FAMILY MEDICINE

## 2023-12-14 PROCEDURE — 1036F TOBACCO NON-USER: CPT | Performed by: FAMILY MEDICINE

## 2023-12-14 RX ORDER — BUPRENORPHINE AND NALOXONE 8; 2 MG/1; MG/1
FILM, SOLUBLE BUCCAL; SUBLINGUAL
Qty: 12 FILM | Refills: 0 | Status: SHIPPED | OUTPATIENT
Start: 2023-12-14 | End: 2024-01-11 | Stop reason: SDUPTHER

## 2023-12-14 NOTE — PROGRESS NOTES
Chief complaint: Opioid dependence treatment    HPI: Pt denies having cravings for opioids or withdrawals symptoms.  Pt denies abusing any opioids. Pt denies drinking eoth. pt has been attending NA/AA meeting. pt has been feeling functional with current dose of buprenorphine tx. Pt was able to perform daily activities well. Pt would have cravings for opioids and may abuse opioids again without buprenorphine treatment. No depressed mood or HI/SI. Normal BM. No drowsiness, headache, imbalance or confusion. Pt sleeps well most nights.   PE: NAD, Well groomed, eyes: Normal pupil size, no sclera icterus, no nasal discharge, no respiratory exertion, Lungs: CTA bilaterally, heart: RRR, abdomen: soft, no tenderness, BS+, normal gait and station, good judgment and insight,  good mood and normal affect. No HI/SI  A/P: Opioid dependence, Pt denies having cravings or withdrawals. Pt denies any opioid abuse. Caution patient that transmucosal buprenorphine can increase the risk of  dental/gum problems, including tooth decay, cavities, oral infections, and loss of teeth. Recommend  the pt to see a dentist regularly. Recommend pt to gently rinse teeth and gums with water after the medicine has completely dissolved.  Wait at least 1 hour before brushing teeth. Informed pt the alternative medical treatment for opioid dependence such as Probuphine implant, SUBLOCADE injection, vivitrol shot, oral naltrexone pills, in-patient rehabilitation.    I personally reviewed pt's UDS performed in office today.  The UDS was negative for opiate but + for amp and thc. I reviewed pt's OARRS report today.  The OARRS report showed that pt filled buprenorphine consistently as prescribed. Pt has not filled any other opioid medications recently. No indication of physician shopping. Caution pt that alcohol and benzodiazepine can interact with buprenorphine causing potential death and should be avoided while on buprenorphine.  Pt understood that  buprenorphine can be addictive too. Inform pt that buprenorphine diversion is illegal.  Recommend pt to keep Buprenorphine in a locked place and to keep it out reach of children. Recommend to keep naloxone available in case of opioid OD. Inform pt to keep naloxone readily available in case of opioid OD. Encourage the mandatory counseling. Pt should not drive or operate machinery if incoordination or confusion occurs. I have considered the risks of abuse, dependence, addiction and diversion. I believe that it is clinically appropriate for the pt to continue buprenorphine treatment. RTC as scheduled  Dc using marijuana

## 2024-01-11 ENCOUNTER — OFFICE VISIT (OUTPATIENT)
Dept: PRIMARY CARE | Facility: CLINIC | Age: 34
End: 2024-01-11

## 2024-01-11 VITALS — DIASTOLIC BLOOD PRESSURE: 76 MMHG | SYSTOLIC BLOOD PRESSURE: 125 MMHG

## 2024-01-11 DIAGNOSIS — F12.20 TETRAHYDROCANNABINOL (THC) DEPENDENCE (MULTI): Primary | ICD-10-CM

## 2024-01-11 DIAGNOSIS — F11.20 OPIOID DEPENDENCE ON AGONIST THERAPY (MULTI): ICD-10-CM

## 2024-01-11 PROCEDURE — 1036F TOBACCO NON-USER: CPT | Performed by: FAMILY MEDICINE

## 2024-01-11 PROCEDURE — 80305 DRUG TEST PRSMV DIR OPT OBS: CPT | Performed by: FAMILY MEDICINE

## 2024-01-11 PROCEDURE — 99214 OFFICE O/P EST MOD 30 MIN: CPT | Performed by: FAMILY MEDICINE

## 2024-01-11 RX ORDER — BUPRENORPHINE AND NALOXONE 8; 2 MG/1; MG/1
FILM, SOLUBLE BUCCAL; SUBLINGUAL
Qty: 12 FILM | Refills: 0 | Status: SHIPPED | OUTPATIENT
Start: 2024-01-11 | End: 2024-02-07 | Stop reason: SDUPTHER

## 2024-01-11 NOTE — PROGRESS NOTES
Chief complaint: Opioid dependence treatment    HPI: Pt felt functional at daily activities with buprenorphine tx. Pt denies having cravings for opioids or withdrawals symptoms. Pt denies abusing any opioids. Pt has been attending NA/AA meeting or counseling. No eoth use. No HA, constipation, imbalance or confusion. No insomnia. Pt would have cravings and withdrawals without buprenorphine treatment. No depressed mood. No HI/SI.   PE: No acute distress, well groomed, eyes: no sclera icterus, normal pupil size, no exertional respiration, Lungs: CTA bilaterally, heart: RRR, abdomen: soft, no tenderness, BS+,  good judgment, memory and insight, good mood. Normal affect. No HI/SI or paranoia  A/P: Opioid dependence, Pt denies cravings and withdrawals. Pt denies any opioid abuse. Pt felt functional with buprenorphine tx.  I personally reviewed pt's urine drug screen performed in office today.  The urine drug screen was negative for opiate. I reviewed pt's updated OARRS report today.  The OARRS report showed that pt filled buprenorphine as prescribed.  Recommend pt to gently rinse teeth and gums with water after the medicine has completely dissolved.  Pt understood to avoid alcohol and benzodiazepine which can interact with buprenorphine causing respiratory suppression and potential death. Pt understood that buprenorphine is addictive. Caution that buprenorphine diversion is against the law. Recommend pt to keep buprenorphine out of reach of children. Recommend to keep naloxone available in case of opioid OD. Encourage counseling for opioid dependence. Pt should not drive or operate machinery if incoordination or confusion occurs. I have considered the risks of abuse, dependence, addiction and diversion. I believe that it is clinically appropriate for the pt to continue current buprenorphine treatment. RTC as scheduled.  Dc marijuana use

## 2024-02-08 ENCOUNTER — APPOINTMENT (OUTPATIENT)
Dept: PRIMARY CARE | Facility: CLINIC | Age: 34
End: 2024-02-08

## 2024-03-06 ENCOUNTER — OFFICE VISIT (OUTPATIENT)
Dept: PRIMARY CARE | Facility: CLINIC | Age: 34
End: 2024-03-06
Payer: MEDICARE

## 2024-03-06 VITALS — SYSTOLIC BLOOD PRESSURE: 132 MMHG | DIASTOLIC BLOOD PRESSURE: 76 MMHG | HEART RATE: 76 BPM

## 2024-03-06 DIAGNOSIS — F11.20 OPIOID DEPENDENCE ON AGONIST THERAPY (MULTI): ICD-10-CM

## 2024-03-06 PROCEDURE — 99214 OFFICE O/P EST MOD 30 MIN: CPT | Performed by: FAMILY MEDICINE

## 2024-03-06 PROCEDURE — 80305 DRUG TEST PRSMV DIR OPT OBS: CPT | Performed by: FAMILY MEDICINE

## 2024-03-06 PROCEDURE — 1036F TOBACCO NON-USER: CPT | Performed by: FAMILY MEDICINE

## 2024-03-06 RX ORDER — BUPRENORPHINE AND NALOXONE 8; 2 MG/1; MG/1
FILM, SOLUBLE BUCCAL; SUBLINGUAL
Qty: 12 FILM | Refills: 0 | Status: SHIPPED | OUTPATIENT
Start: 2024-03-06 | End: 2024-04-03 | Stop reason: SDUPTHER

## 2024-03-06 NOTE — PROGRESS NOTES
Chief complaint: Opioid dependence treatment    HPI: Pt stated that current dose of buprenorphine controlled cravings for opioids and withdrawals symptoms well. Pt feels normal and performs daily activities well.  Pt denies abusing any opioids. pt has social support and has been having counseling. pt denies drinking alcohol. No SE from buprenorphine  such as HA, constipation, imbalance or confusion. Pt sleeps well most night. Pt would have cravings and withdrawals without buprenorphine treatment. Pt denies depressed mood. No HI/SI.   PE: A&Ox3, No acute distress, well groomed, eyes: no sclera icterus, Lungs: CTA bilaterally, heart: RRR, abdomen: soft, no tenderness, BS+, normal station and gait, normal mood and affect. No HI/SI   A/P:  Opioid dependence, Pt denies cravings and withdrawals well. Pt denies any opioid abuse.   I personally reviewed pt's urine drug screen done in office today.  The urine drug screen was negative for opiate but + for thc and amp. I reviewed pt's updated OARRS report today.  The OARRS report showed that pt filled buprenorphine consistently as prescribed. Caution patient that alcohol  should be avoided while on buprenorphine. Pt understood that buprenorphine is addictive. Lending meds is illegal. Recommend pt to lock  buprenorphine in a safe place and  out of reach of children. Recommend to keep naloxone available in case of opioid OD. Cont counseling for opioid dependence. Pt should not drive or operate machinery if incoordination or confusion occurs. I have considered the risks of abuse, dependence, addiction and diversion. I believe that it is clinically appropriate for the pt to continue current buprenorphine treatment. Will taper the dose as tolerated.  RTC as scheduled  Dc marijuana use

## 2024-04-03 ENCOUNTER — OFFICE VISIT (OUTPATIENT)
Dept: PRIMARY CARE | Facility: CLINIC | Age: 34
End: 2024-04-03

## 2024-04-03 VITALS — SYSTOLIC BLOOD PRESSURE: 128 MMHG | DIASTOLIC BLOOD PRESSURE: 73 MMHG

## 2024-04-03 DIAGNOSIS — F11.20 OPIOID DEPENDENCE ON AGONIST THERAPY (MULTI): ICD-10-CM

## 2024-04-03 PROCEDURE — 99213 OFFICE O/P EST LOW 20 MIN: CPT | Performed by: FAMILY MEDICINE

## 2024-04-03 RX ORDER — BUPRENORPHINE AND NALOXONE 8; 2 MG/1; MG/1
FILM, SOLUBLE BUCCAL; SUBLINGUAL
Qty: 12 FILM | Refills: 0 | Status: SHIPPED | OUTPATIENT
Start: 2024-04-03 | End: 2024-05-01 | Stop reason: SDUPTHER

## 2024-04-03 NOTE — PROGRESS NOTES
Chief complaint: Opioid dependence treatment    pt denies cravings for opioids and withdrawals symptoms with buprenorphine tx. pt felt functional at daily activities. No headache, confusion, drowsiness, imbalance, insomnia or constipation. Pt denies abusing any opioids.  Pt denies drinking eoth.  pt has been attending counseling and/or NA/AA meeting. Pt has good family support. Pt would have cravings and withdrawals without buprenorphine treatment. pt denies depressed mood   PE: No acute distress, well groomed, eyes: normal pupil size, no sclera icterus, no nasal discharge, no exertional respiration, Lungs: CTA bilaterally, heart: RRR, abdomen: soft, no tenderness, BS+, good balance, good memory and judgment. No depressed mood   A/P:  Opioid dependence, Pt denies any opioid abuse. Cravings and withdrawals were controlled.   I reviewed pt's OARRS report today. The OARRS report showed pt filled meds as prescribed.  No physician shopping.   Inform pt that counseling and/or NA/AA meeting are mandatory for the buprenorphine treatment program.  Inform pt that buprenorphine is addictive and that buprenorphine diversion is against the law. Pt has narcan readily available in case of opioid OD. Pt agreed to ask for a new prescription for narcan upon expiration or use of the old narcan kit. Continue counseling and  buprenorphine tx for opioid dependence. Recommend  to lock  buprenorphine in a secure  location. I have considered the risks of abuse, dependence, addiction and diversion. I believe that it is clinically appropriate for the pt to continue current buprenorphine treatment. f/u as scheduled

## 2024-05-01 ENCOUNTER — OFFICE VISIT (OUTPATIENT)
Dept: PRIMARY CARE | Facility: CLINIC | Age: 34
End: 2024-05-01
Payer: COMMERCIAL

## 2024-05-01 VITALS — SYSTOLIC BLOOD PRESSURE: 134 MMHG | DIASTOLIC BLOOD PRESSURE: 81 MMHG | HEART RATE: 72 BPM

## 2024-05-01 DIAGNOSIS — F11.20 OPIOID DEPENDENCE ON AGONIST THERAPY (MULTI): Primary | ICD-10-CM

## 2024-05-01 DIAGNOSIS — F12.20 TETRAHYDROCANNABINOL (THC) DEPENDENCE (MULTI): ICD-10-CM

## 2024-05-01 PROCEDURE — 99214 OFFICE O/P EST MOD 30 MIN: CPT | Performed by: FAMILY MEDICINE

## 2024-05-01 PROCEDURE — 80305 DRUG TEST PRSMV DIR OPT OBS: CPT | Performed by: FAMILY MEDICINE

## 2024-05-01 RX ORDER — BUPRENORPHINE AND NALOXONE 8; 2 MG/1; MG/1
FILM, SOLUBLE BUCCAL; SUBLINGUAL
Qty: 11 FILM | Refills: 0 | Status: SHIPPED | OUTPATIENT
Start: 2024-05-01 | End: 2024-05-29 | Stop reason: SDUPTHER

## 2024-05-01 RX ORDER — BUPRENORPHINE AND NALOXONE 8; 2 MG/1; MG/1
FILM, SOLUBLE BUCCAL; SUBLINGUAL
Qty: 12 FILM | Refills: 0 | Status: SHIPPED | OUTPATIENT
Start: 2024-05-01 | End: 2024-05-01 | Stop reason: SDUPTHER

## 2024-05-01 NOTE — PROGRESS NOTES
Chief complaint: Opioid dependence treatment  Pt stated that cravings for opioids and withdrawals symptoms were controlled with buprenorphine. Pt has been taking buprenorphine as instructed. Pt denies abusing any opioids.  Pt sleeps well most nights. pt has been attending counseling and/or NA/AA meeting. Pt denies drinking eoth. Pt denies having HA, constipation, imbalance or confusion. Pt denies mental cloudiness, drowsiness.  Pt felt functional while on current buprenorphine treatment. Without buprenorphine treatment. Pt would have cravings and withdrawals. Pt denies depressed mood or HI/SI.   No acute distress, well groomed, eyes: no sclera icterus, normal pupil size, neck is supple, Lungs: CTA bilaterally, heart: RRR, abdomen: soft, no tenderness, normal BS, good balance, good judgment and insight. Good mood and normal affect. No HI/SI   Opioid dependence, Pt denies any opioid abuse. Cravings and withdrawals were controlled.   I personally reviewed pt's urine drug screen test performed in office today.  The urine drug screen was negative for opiate but + for yhc and amp. I reviewed pt's updated OARRS report today.  The OARRS report showed that pt filled buprenorphine as prescribed. No sign of physician shopping. The patient was informed that Eoth  should be avoided while on buprenorphine. pt understood that diversion of buprenorphine is illegal. Recommend to taper down the dose of buprenorphine as dir. Recommend pt to keep buprenorphine in a secure place and out of reach of children. Recommend to keep naloxone kits available in case of opioid OD. Continue counseling for opioid dependence. Pt should not drive or operate machinery if incoordination or confusion occurs. I have considered the risks of abuse, dependence, addiction and diversion. I believe that it is clinically appropriate for the pt to continue buprenorphine treatment.  pt knows how to use narcan in case of overdose emergency. Caution pt that  buprenorphine can potentially damage teeth. Recommend to see a dentist regularly. f/u as scheduled   Dc marijuana use

## 2024-05-29 ENCOUNTER — OFFICE VISIT (OUTPATIENT)
Dept: PRIMARY CARE | Facility: CLINIC | Age: 34
End: 2024-05-29
Payer: COMMERCIAL

## 2024-05-29 VITALS — SYSTOLIC BLOOD PRESSURE: 125 MMHG | DIASTOLIC BLOOD PRESSURE: 74 MMHG

## 2024-05-29 DIAGNOSIS — F11.20 OPIOID DEPENDENCE ON AGONIST THERAPY (MULTI): Primary | ICD-10-CM

## 2024-05-29 DIAGNOSIS — F12.20 TETRAHYDROCANNABINOL (THC) DEPENDENCE (MULTI): ICD-10-CM

## 2024-05-29 LAB
POC AMPHETAMINE: NEGATIVE NG/ML
POC COCAINE: NEGATIVE NG/ML
POC METHAMPHETAMINE: NEGATIVE NG/ML
POC OPIATES: NEGATIVE NG/ML
POC THC: POSITIVE NG/ML

## 2024-05-29 PROCEDURE — 80305 DRUG TEST PRSMV DIR OPT OBS: CPT | Performed by: FAMILY MEDICINE

## 2024-05-29 PROCEDURE — 99214 OFFICE O/P EST MOD 30 MIN: CPT | Performed by: FAMILY MEDICINE

## 2024-05-29 RX ORDER — BUPRENORPHINE AND NALOXONE 8; 2 MG/1; MG/1
FILM, SOLUBLE BUCCAL; SUBLINGUAL
Qty: 11 FILM | Refills: 0 | Status: SHIPPED | OUTPATIENT
Start: 2024-05-29

## 2024-05-29 NOTE — PROGRESS NOTES
Chief complaint: Opioid dependence treatment  Pt performs daily activities well with current buprenorphine treatment.  Cravings for opioids and withdrawals symptoms were controlled.  Pt denies abusing any opioids.Pt denies drinking eoth. Pt has been attending meeting or counseling for opioid dependence. Pt has been taking buprenorphine as dir. Pt denies having headache, constipation, mental cloudiness, fatigue, insomnia, drowsiness. Pt would have cravings and withdrawals without buprenorphine treatment. Pt has had good mood.   PE: No acute distress, well groomed, eyes: normal pupil size, no sclera icterus, normal respiration effort. Lungs: CTA bilaterally, heart: RRR, abdomen: soft, no tenderness, normal BS, Good mood and normal affect. No HI/SI  A/P: Opioid dependence, Pt denies any opioid abuse. Cravings for opioids and withdrawals were controlled.    I personally reviewed pt's urine drug screen test performed in office today.  The urine drug screen was negative for opiate but + for thc. I reviewed pt's updated OARRS report.  The OARRS report showed that pt filled buprenorphine as prescribed. No opioid shopping.   Pt understood to avoid use  Eoth while on buprenorphine due to potential interactions causing death. Inform pt that buprenorphine is addictive. Inform pt that selling or giving away buprenorphine is against the law. Recommend pt to keep buprenorphine out of reach of children. Recommend to keep naloxone kits available in case of opioid OD. Recommend  counseling for opioid dependence. Pt should not drive or operate machinery if incoordination or confusion occurs. I have considered the risks of abuse, dependence, addiction and diversion. I believe that it is clinically appropriate for the pt to continue buprenorphine treatment. Caution patient that transmucosal buprenorphine can increase the risk of tooth decay, cavities, oral infections, and loss of teeth. Recommend the pt to see a dentist for dental exam  regularly. Recommend pt to gently rinse teeth and gums with water after the medicine has completely dissolved. Informed pt the alternative medical treatments for opioid dependence such as Probuphine implant, SUBLOCADE injection, vivitrol shot, in-patient rehabilitation are available. f/u as scheduled.  Dc marijuana use

## 2024-06-25 ENCOUNTER — OFFICE VISIT (OUTPATIENT)
Dept: PRIMARY CARE | Facility: CLINIC | Age: 34
End: 2024-06-25
Payer: COMMERCIAL

## 2024-06-25 VITALS — DIASTOLIC BLOOD PRESSURE: 78 MMHG | SYSTOLIC BLOOD PRESSURE: 132 MMHG | HEART RATE: 72 BPM

## 2024-06-25 DIAGNOSIS — F12.20 TETRAHYDROCANNABINOL (THC) DEPENDENCE (MULTI): ICD-10-CM

## 2024-06-25 DIAGNOSIS — F11.20 OPIOID DEPENDENCE ON AGONIST THERAPY (MULTI): Primary | ICD-10-CM

## 2024-06-25 PROCEDURE — 80305 DRUG TEST PRSMV DIR OPT OBS: CPT | Performed by: FAMILY MEDICINE

## 2024-06-25 PROCEDURE — 99214 OFFICE O/P EST MOD 30 MIN: CPT | Performed by: FAMILY MEDICINE

## 2024-06-25 RX ORDER — BUPRENORPHINE AND NALOXONE 8; 2 MG/1; MG/1
FILM, SOLUBLE BUCCAL; SUBLINGUAL
Qty: 11 FILM | Refills: 0 | Status: SHIPPED | OUTPATIENT
Start: 2024-06-25

## 2024-06-25 NOTE — PROGRESS NOTES
Chief complaint: Opioid dependence treatment  Pt has been compliant with taking buprenorphine as dir. pt denies drinking eoth. Pt was functional at daily activities. Pt had good relationship with people.  Pt has been attending AA/NA as instructed. Pt stated that buprenorphine treatment controlled cravings for opioids and withdrawals symptoms well. pt denies abusing any opioids. Pt denies depressed mood. Pt denies HI/SI. Pt had regular BM. No headache or insomnia. No drowsiness, confusion or imbalance.   A&Ox3, No distress, well groomed, eyes: No sclera icterus. Normal pupil size, No nasal discharge, no exertional respiration. Lungs: CTA bilaterally, heart: RRR, abdomen: soft, no tenderness, BS+, normal gait and station, no depressed mood   Opioid dependence. Pt denies any opioid abuse while on buprenorphine treatment.   I personally reviewed pt's UDS performed in office today.  UDS was negative for opiate but + for thc and amp. I reviewed pt's updated OARRS report today.  The OARRS report showed that pt filled buprenorphine as prescribed. No physician shopping.  I have considered the risks of abuse, dependence, addiction and diversion. I believe that it is clinically appropriate for the pt to continue buprenorphine treatment. Inform pt to avoid alcohol while on buprenorphine. Informed pt that buprenorphine is addictive. Caution pt that lending buprenorphine is against the law. Recommend pt to keep Buprenorphine out of reach of children. Recommend to keep naloxone available in case of opioid OD. Caution pt that buprenorphine can potentially damage the teeth. Recommend regular dental check up with the dentist. Continue current dose of buprenorphine. RTC as scheduled. Continue counseling. Pt should not drive or operate machinery if incoordination or confusion occurs.   Dc using marijuana

## 2024-06-26 ENCOUNTER — APPOINTMENT (OUTPATIENT)
Dept: PRIMARY CARE | Facility: CLINIC | Age: 34
End: 2024-06-26
Payer: COMMERCIAL

## 2024-07-23 ENCOUNTER — APPOINTMENT (OUTPATIENT)
Dept: PRIMARY CARE | Facility: CLINIC | Age: 34
End: 2024-07-23
Payer: COMMERCIAL

## 2024-07-23 DIAGNOSIS — F11.20 OPIOID DEPENDENCE ON AGONIST THERAPY (MULTI): Primary | ICD-10-CM

## 2024-07-23 DIAGNOSIS — F12.20 TETRAHYDROCANNABINOL (THC) DEPENDENCE (MULTI): ICD-10-CM

## 2024-07-23 PROCEDURE — 80305 DRUG TEST PRSMV DIR OPT OBS: CPT | Performed by: FAMILY MEDICINE

## 2024-07-23 PROCEDURE — 99214 OFFICE O/P EST MOD 30 MIN: CPT | Performed by: FAMILY MEDICINE

## 2024-07-23 RX ORDER — BUPRENORPHINE AND NALOXONE 8; 2 MG/1; MG/1
FILM, SOLUBLE BUCCAL; SUBLINGUAL
Qty: 11 FILM | Refills: 0 | Status: SHIPPED | OUTPATIENT
Start: 2024-07-23

## 2024-07-23 NOTE — PROGRESS NOTES
Chief complaint: Opioid dependence treatment  HPI: Pt denies cravings for opioids and withdrawals symptoms. Pt denies abusing opioid.  Pt has been taking buprenorphine as prescribed.  pt has been attending  counseling or NA/AA meeting as recommended. pt has had good support.  Pt felt functional at daily activities. Pt denies taking benzo.  Pt denies drinking eoth. Without buprenorphine, pt would have withdrawals. Normal appetite, no constipation, headache or insomnia. No depressed mood.   PE: No distress, well groomed, eyes: No sclera icterus. Normal pupil size, normal respiratory effort, Lungs: CTA bilaterally, heart: RRR, abdomen: soft, no tenderness, BS+, good balance, good memory, no depressed mood and normal affect. No HI/SI  A/P: Opioid dependence, pt denies any opioid abuse. Pt denies cravings and withdrawals symptoms. I personally reviewed pt's UDS done in office today which was negative for opiate but + for amp and thc. I reviewed pt's OARRS report. The OARRS showed no sign of physician shopping. Pt's OARRS was filed into pt's medical record. Recommend to avoid alcohol while on buprenorphine. Pt was informed that buprenorphine is addictive. Pt understood that buprenorphine diversion is illegal. Recommend to lock Buprenorphine in a secure place and out of reach of children. Cont counseling for opioid dependence treatment. Pt should not drive or operate machinery if incoordination or confusion occurs. Recommend pt to keep naloxone kits readily available in case of opioid OD. I have considered the risks of abuse, dependence, addiction and diversion. I believe that it is clinically appropriate for the pt to continue buprenorphine treatment. Recommend  the pt have a dental exam with a dentist  twice a year while on buprenorphine. Recommend pt to gently rinse teeth and gums with water after the medicine has completely dissolved.   f/u as scheduled  Dc marijuana use

## 2024-08-20 ENCOUNTER — APPOINTMENT (OUTPATIENT)
Dept: PRIMARY CARE | Facility: CLINIC | Age: 34
End: 2024-08-20
Payer: COMMERCIAL

## 2024-08-21 ENCOUNTER — OFFICE VISIT (OUTPATIENT)
Dept: PRIMARY CARE | Facility: CLINIC | Age: 34
End: 2024-08-21
Payer: COMMERCIAL

## 2024-08-21 VITALS — HEART RATE: 80 BPM | SYSTOLIC BLOOD PRESSURE: 127 MMHG | DIASTOLIC BLOOD PRESSURE: 72 MMHG

## 2024-08-21 DIAGNOSIS — F11.20 OPIOID DEPENDENCE ON AGONIST THERAPY (MULTI): Primary | ICD-10-CM

## 2024-08-21 DIAGNOSIS — F12.20 TETRAHYDROCANNABINOL (THC) DEPENDENCE (MULTI): ICD-10-CM

## 2024-08-21 PROCEDURE — 1036F TOBACCO NON-USER: CPT | Performed by: FAMILY MEDICINE

## 2024-08-21 PROCEDURE — 99214 OFFICE O/P EST MOD 30 MIN: CPT | Performed by: FAMILY MEDICINE

## 2024-08-21 PROCEDURE — 80305 DRUG TEST PRSMV DIR OPT OBS: CPT | Performed by: FAMILY MEDICINE

## 2024-08-21 RX ORDER — BUPRENORPHINE AND NALOXONE 8; 2 MG/1; MG/1
FILM, SOLUBLE BUCCAL; SUBLINGUAL
Qty: 6 FILM | Refills: 0 | Status: SHIPPED | OUTPATIENT
Start: 2024-08-21

## 2024-08-21 NOTE — PROGRESS NOTES
Chief complaint: Opioid dependence treatment  HPI: Pt has been compliant with taking buprenorphine as dir. pt denies drinking eoth. Pt was functional at daily activities. Pt had good relationship with people.  Pt has been attending AA/NA as instructed. Pt stated that buprenorphine treatment controlled cravings for opioids and withdrawals symptoms well. pt denies abusing any opioids. Pt denies depressed mood.  Pt had regular BM. No headache. No drowsiness, confusion or imbalance.   PE:  No distress, well groomed, eyes: No sclera icterus. Normal pupil size, No nasal discharge, no exertional respiration. Lungs: CTA bilaterally, heart: RRR, abdomen: soft, no tenderness, BS+, no depressed mood   Opioid dependence. Pt denies any opioid abuse while on buprenorphine treatment.   I personally reviewed pt's UDS performed in office today.  UDS was negative for opiate. But + for thc and amp I reviewed pt's updated OARRS report today.  The OARRS report showed that pt filled buprenorphine as prescribed. No physician shopping.  I have considered the risks of abuse, dependence, addiction and diversion. I believe that it is clinically appropriate for the pt to continue buprenorphine treatment. Inform pt to avoid alcohol while on buprenorphine. Informed pt that buprenorphine is addictive. Caution pt that lending buprenorphine is against the law. Recommend pt to keep Buprenorphine out of reach of children. Recommend to keep naloxone available in case of opioid OD. Caution pt that buprenorphine can potentially damage the teeth. Recommend regular dental check up with the dentist. Continue current dose of buprenorphine. Fu closely due to noncompliance with urine test. Continue counseling. Pt should not drive or operate machinery if incoordination or confusion occurs.   Dc marijuana

## 2024-08-29 ENCOUNTER — LAB (OUTPATIENT)
Dept: LAB | Facility: LAB | Age: 34
End: 2024-08-29
Payer: COMMERCIAL

## 2024-08-29 DIAGNOSIS — F11.20 OPIOID DEPENDENCE ON AGONIST THERAPY (MULTI): ICD-10-CM

## 2024-08-29 PROCEDURE — 80307 DRUG TEST PRSMV CHEM ANLYZR: CPT

## 2024-08-29 PROCEDURE — 80349 CANNABINOIDS NATURAL: CPT

## 2024-08-29 PROCEDURE — 80324 DRUG SCREEN AMPHETAMINES 1/2: CPT

## 2024-08-30 LAB
AMPHETAMINES UR QL SCN: ABNORMAL
BARBITURATES UR QL SCN: ABNORMAL
BENZODIAZ UR QL SCN: ABNORMAL
BZE UR QL SCN: ABNORMAL
CANNABINOIDS UR QL SCN: ABNORMAL
FENTANYL+NORFENTANYL UR QL SCN: ABNORMAL
METHADONE UR QL SCN: ABNORMAL
OPIATES UR QL SCN: ABNORMAL
OXYCODONE+OXYMORPHONE UR QL SCN: ABNORMAL
PCP UR QL SCN: ABNORMAL

## 2024-09-02 LAB — CARBOXYTHC UR-MCNC: >500 NG/ML

## 2024-09-03 LAB
AMPHET UR-MCNC: >5000 NG/ML
BUPRENORPHINE UR-MCNC: 112 NG/ML
BUPRENORPHINE UR-MCNC: <2 NG/ML
MDA UR-MCNC: <200 NG/ML
MDEA UR-MCNC: <200 NG/ML
MDMA UR-MCNC: <200 NG/ML
METHAMPHET UR-MCNC: <200 NG/ML
NALOXONE UR CFM-MCNC: <100 NG/ML
NORBUPRENORPHINE UR CFM-MCNC: 75 NG/ML
NORBUPRENORPHINE UR-MCNC: 103 NG/ML
PHENTERMINE UR CFM-MCNC: <200 NG/ML

## 2024-09-04 ENCOUNTER — APPOINTMENT (OUTPATIENT)
Dept: PRIMARY CARE | Facility: CLINIC | Age: 34
End: 2024-09-04
Payer: COMMERCIAL

## 2024-09-04 DIAGNOSIS — F12.20 TETRAHYDROCANNABINOL (THC) DEPENDENCE (MULTI): ICD-10-CM

## 2024-09-04 DIAGNOSIS — F11.20 OPIOID DEPENDENCE ON AGONIST THERAPY (MULTI): Primary | ICD-10-CM

## 2024-09-04 PROCEDURE — 99214 OFFICE O/P EST MOD 30 MIN: CPT | Performed by: FAMILY MEDICINE

## 2024-09-04 PROCEDURE — 80305 DRUG TEST PRSMV DIR OPT OBS: CPT | Performed by: FAMILY MEDICINE

## 2024-09-04 RX ORDER — BUPRENORPHINE AND NALOXONE 8; 2 MG/1; MG/1
FILM, SOLUBLE BUCCAL; SUBLINGUAL
Qty: 11 FILM | Refills: 0 | Status: SHIPPED | OUTPATIENT
Start: 2024-09-04

## 2024-09-04 NOTE — PROGRESS NOTES
Chief complaint: Opioid dependence treatment  HPI: Pt has been compliant with taking buprenorphine as dir. pt denies drinking eoth. Pt was functional at work and daily activities. Pt had good relationship with people.  Pt has been attending AA/NA as instructed. Pt stated that buprenorphine treatment controlled cravings for opioids and withdrawals symptoms well. pt denies abusing any opioids. Pt denies depressed mood.  Pt had regular BM. No headache. No drowsiness, confusion.   PE:  No distress, well groomed, eyes: No sclera icterus. Normal pupil size, No nasal discharge, Lungs: CTA bilaterally, heart: RRR, abdomen: soft, no tenderness, BS+, no ext needle marks, no depressed mood or flat affect. No HI/SI or paranoia  Opioid dependence. Pt denies any opioid abuse while on buprenorphine treatment.   I personally reviewed pt's UDS performed in office today.  UDS was negative for opiate but + for thc and amp. I reviewed pt's updated OARRS report today.  The OARRS report showed that pt filled buprenorphine as prescribed. No physician shopping.  I have considered the risks of abuse, dependence, addiction and diversion. I believe that it is clinically appropriate for the pt to continue buprenorphine treatment. Inform pt to avoid alcohol while on buprenorphine. Informed pt that buprenorphine is addictive. Caution pt that lending buprenorphine is against the law. Recommend pt to keep Buprenorphine out of reach of children. Recommend to keep naloxone available in case of opioid OD. Caution pt that buprenorphine can potentially damage the teeth. Recommend regular dental check up with the dentist. Continue current dose of buprenorphine. RTC as scheduled. Continue counseling. Pt should not drive or operate machinery if incoordination or confusion occurs.   Dc marijuana use

## 2024-10-02 ENCOUNTER — APPOINTMENT (OUTPATIENT)
Dept: PRIMARY CARE | Facility: CLINIC | Age: 34
End: 2024-10-02
Payer: COMMERCIAL

## 2024-10-07 ENCOUNTER — APPOINTMENT (OUTPATIENT)
Dept: PRIMARY CARE | Facility: CLINIC | Age: 34
End: 2024-10-07
Payer: COMMERCIAL

## 2024-10-07 VITALS — DIASTOLIC BLOOD PRESSURE: 76 MMHG | SYSTOLIC BLOOD PRESSURE: 134 MMHG

## 2024-10-07 DIAGNOSIS — F11.20 OPIOID DEPENDENCE ON AGONIST THERAPY (MULTI): ICD-10-CM

## 2024-10-07 PROCEDURE — 99213 OFFICE O/P EST LOW 20 MIN: CPT | Performed by: FAMILY MEDICINE

## 2024-10-07 RX ORDER — BUPRENORPHINE AND NALOXONE 8; 2 MG/1; MG/1
FILM, SOLUBLE BUCCAL; SUBLINGUAL
Qty: 11 FILM | Refills: 0 | Status: SHIPPED | OUTPATIENT
Start: 2024-10-07

## 2024-10-07 NOTE — PROGRESS NOTES
Chief complaint: Opioid dependence treatment  HPI: Pt denies having cravings for opioids or withdrawals symptoms.  Pt denies abusing any opioids. Pt denies drinking eoth. pt has been attending NA/AA meeting. pt has been feeling functional with current dose of buprenorphine tx. Pt was able to perform daily activities well. Pt would have cravings for opioids and withdrawals without buprenorphine treatment. No depressed mood. Normal BM. No drowsiness, headache, imbalance or confusion.   PE: NAD, Well groomed, eyes: Normal pupil size, no sclera icterus, no nasal discharge, normal  respiratory effort, Lungs: CTA bilaterally, heart: RRR, abdomen: soft, no tenderness, BS+,  good mood   Opioid dependence, Pt denies having cravings or withdrawals. Pt denies any opioid abuse. Caution patient that transmucosal buprenorphine can increase the risk of  dental problems, including tooth decay, cavities, oral infections, and loss of teeth. Recommend  the pt to see a dentist for dental exam regularly. Recommend pt to gently rinse teeth and gums with water after the medicine has completely dissolved.  Informed pt the alternative medical treatment for opioid dependence such as Probuphine implant, SUBLOCADE injection, vivitrol shot, oral naltrexone pills, In-patient rehabilitation.   I reviewed pt's OARRS report today.  The OARRS report showed that pt filled buprenorphine consistently as prescribed. Pt has not filled any other opioid medications recently. No indication of physician shopping. No alcohol while on  buprenorphine.  Pt understood that buprenorphine can be addictive too. Inform pt that buprenorphine diversion is illegal.  recommend tapering down the dose of buprenorphine when tolerated. Recommend pt to keep Buprenorphine in a locked place and to keep it out reach of children.  Inform pt to keep naloxone readily available in case of opioid OD. Encourage  counseling. Pt should not drive or operate machinery if incoordination or  confusion occurs. I have considered the risks of abuse, dependence, addiction and diversion. I believe that it is clinically appropriate for the pt to continue buprenorphine treatment.  RTC as scheduled

## 2024-11-04 ENCOUNTER — APPOINTMENT (OUTPATIENT)
Dept: PRIMARY CARE | Facility: CLINIC | Age: 34
End: 2024-11-04
Payer: COMMERCIAL

## 2024-11-05 ENCOUNTER — APPOINTMENT (OUTPATIENT)
Dept: PRIMARY CARE | Facility: CLINIC | Age: 34
End: 2024-11-05
Payer: COMMERCIAL

## 2024-11-06 ENCOUNTER — OFFICE VISIT (OUTPATIENT)
Dept: PRIMARY CARE | Facility: CLINIC | Age: 34
End: 2024-11-06
Payer: COMMERCIAL

## 2024-11-06 VITALS — DIASTOLIC BLOOD PRESSURE: 76 MMHG | SYSTOLIC BLOOD PRESSURE: 125 MMHG

## 2024-11-06 DIAGNOSIS — F11.20 OPIOID DEPENDENCE ON AGONIST THERAPY (MULTI): ICD-10-CM

## 2024-11-06 PROCEDURE — 99214 OFFICE O/P EST MOD 30 MIN: CPT | Performed by: FAMILY MEDICINE

## 2024-11-06 PROCEDURE — 80305 DRUG TEST PRSMV DIR OPT OBS: CPT | Performed by: FAMILY MEDICINE

## 2024-11-06 RX ORDER — BUPRENORPHINE AND NALOXONE 8; 2 MG/1; MG/1
FILM, SOLUBLE BUCCAL; SUBLINGUAL
Qty: 11 FILM | Refills: 0 | Status: SHIPPED | OUTPATIENT
Start: 2024-11-06

## 2024-11-06 NOTE — PROGRESS NOTES
Chief complaint: Opioid dependence treatment  HPI: Cravings for opioids and withdrawals symptoms were controlled with current dose of buprenorphine tx. Pt denies abusing any opioids. Patient has been attending meeting/counseling as recommended. Pt felt normal and functional with buprenorphine tx.   Pt had good support. Pt denies drinking eoth. No headache, constipation, imbalance, insomnia, drowsiness or confusion.  Pt would have cravings and withdrawals  without buprenorphine treatment. pt denies having depressed mood. No HI/SI.   PE: No acute distress, well groomed, eyes: normal pupil size, no sclera icterus, Lungs: CTA bilaterally, heart: RRR, abdomen: soft, no tenderness, BS+,  good  mood and normal affect. No HI/SI    A/P:  Opioid dependence, pt feels functional at daily activities while on buprenorphine treatment. Pt has been attending counseling/NA/AA meeting for opioid dependence. Pt denies any opioid abuse. Cravings and withdrawals were controlled. I personally reviewed pt's urine drug screen test done in office today. The urine drug screen was negative for opiate but + for amp and thc. I reviewed pt's OARRS report today. The OARRS report showed pt filled medications as prescribed.   Inform pt to avoid  alcohol while on buprenorphine tx. I have considered the risks of abuse, dependence, addiction and diversion. I believe that it is clinically appropriate for the pt to continue current buprenorphine treatment. Continue counseling for opioid dependence. Recommend pt to keep  buprenorphine in a secure place and out of reach of children. Inform pt that lending buprenorphine is illegal. Recommend to keep narcan available in case of opioid OD. Caution of potential teeth/gum damage from buprenorphine. Rinse mouth after buprenorphine has completely dissolved. Recommend to taper off buprenorphine if tolerated. Pt should not drive or operate machinery if incoordination or confusion occurs. f/u as scheduled

## 2024-12-04 ENCOUNTER — APPOINTMENT (OUTPATIENT)
Dept: PRIMARY CARE | Facility: CLINIC | Age: 34
End: 2024-12-04
Payer: COMMERCIAL

## 2024-12-04 VITALS — DIASTOLIC BLOOD PRESSURE: 73 MMHG | SYSTOLIC BLOOD PRESSURE: 126 MMHG

## 2024-12-04 DIAGNOSIS — F11.20 OPIOID DEPENDENCE ON AGONIST THERAPY (MULTI): ICD-10-CM

## 2024-12-04 PROCEDURE — 99213 OFFICE O/P EST LOW 20 MIN: CPT | Performed by: FAMILY MEDICINE

## 2024-12-04 RX ORDER — BUPRENORPHINE AND NALOXONE 8; 2 MG/1; MG/1
FILM, SOLUBLE BUCCAL; SUBLINGUAL
Qty: 11 FILM | Refills: 0 | Status: SHIPPED | OUTPATIENT
Start: 2024-12-04

## 2024-12-04 NOTE — PROGRESS NOTES
Chief complaint: Opioid dependence treatment  HPI: Pt denies cravings for opioids and withdrawals symptoms. Pt feels normal and functional.  Pt denies abusing any opioids. pt has been having counseling. No HA, constipation or confusion.  Pt denies depressed mood.   PE: No acute distress, eyes: no pupil enlargement or sclera icterus, normal respiration, Lungs: CTA bilaterally, heart: RRR, abdomen: soft, no tenderness, BS+,  good  mood and normal affect. No HI/SI   A/P: Opioid dependence, no cravings and withdrawals. Pt denies any opioid abuse.   OAR[place] report showed that pt filled buprenorphine consistently as prescribed. Pt understood that buprenorphine is addictive and lending buprenorphine is illegal. Recommend pt to keep  buprenorphine in a safe place. Recommend to keep naloxone available in case of opioid OD. Cont counseling for opioid dependence. Pt should not drive or operate machinery if incoordination or confusion occurs. I have considered the risks of abuse, dependence, addiction and diversion. I believe that it is clinically appropriate for the pt to continue current buprenorphine treatment.  RTC as scheduled

## 2025-01-03 ENCOUNTER — APPOINTMENT (OUTPATIENT)
Dept: PRIMARY CARE | Facility: CLINIC | Age: 35
End: 2025-01-03
Payer: COMMERCIAL

## 2025-01-03 VITALS — SYSTOLIC BLOOD PRESSURE: 132 MMHG | DIASTOLIC BLOOD PRESSURE: 76 MMHG

## 2025-01-03 DIAGNOSIS — F11.20 OPIOID DEPENDENCE ON AGONIST THERAPY (MULTI): ICD-10-CM

## 2025-01-03 PROCEDURE — 99214 OFFICE O/P EST MOD 30 MIN: CPT | Performed by: FAMILY MEDICINE

## 2025-01-03 PROCEDURE — 80305 DRUG TEST PRSMV DIR OPT OBS: CPT | Performed by: FAMILY MEDICINE

## 2025-01-03 RX ORDER — BUPRENORPHINE AND NALOXONE 8; 2 MG/1; MG/1
FILM, SOLUBLE BUCCAL; SUBLINGUAL
Qty: 9 FILM | Refills: 0 | Status: SHIPPED | OUTPATIENT
Start: 2025-01-04 | End: 2025-01-04 | Stop reason: SDUPTHER

## 2025-01-03 NOTE — PROGRESS NOTES
Chief complaint: Opioid dependence treatment  Pt performs daily activities well with current buprenorphine treatment.  Cravings for opioids and withdrawals symptoms were controlled.  Pt denies abusing any opioids.Pt denies drinking eoth. Pt has been attending meeting or counseling for opioid dependence. Pt has been taking buprenorphine as dir. Pt denies having headache, constipation, mental cloudiness, fatigue, insomnia, drowsiness. Pt would have cravings and withdrawals without buprenorphine treatment. Pt has had good mood. No HI/SI.   PE: No acute distress, well groomed, eyes: normal pupil size, no sclera icterus, normal respiration effort. Lungs: CTA bilaterally, heart: RRR, abdomen: soft, no tenderness, normal BS, Good mood and normal affect. No HI/SI  A/P: Opioid dependence, Pt denies any opioid abuse. Cravings for opioids and withdrawals were controlled.    I personally reviewed pt's urine drug screen test performed in office today.  The urine drug screen was negative for opiate but + for amp and thc. I reviewed pt's updated OARRS report.  The OARRS report showed that pt filled buprenorphine as prescribed. No opioid shopping.   Pt understood to avoid use  Eoth while on buprenorphine due to potential interactions causing death. Inform pt that buprenorphine is addictive. Inform pt that selling or giving away buprenorphine is against the law. Recommend pt to keep buprenorphine out of reach of children. Recommend to keep naloxone kits available in case of opioid OD. Recommend  counseling for opioid dependence. Pt should not drive or operate machinery if incoordination or confusion occurs. I have considered the risks of abuse, dependence, addiction and diversion. I believe that it is clinically appropriate for the pt to continue buprenorphine treatment. Taper the dose as dir. Caution patient that transmucosal buprenorphine can increase the risk of tooth decay, cavities, oral infections, and loss of teeth. Recommend  the pt to see a dentist for dental exam regularly. Recommend pt to gently rinse teeth and gums with water after the medicine has completely dissolved. f/u as scheduled.  Dc using marijuana

## 2025-01-04 RX ORDER — BUPRENORPHINE AND NALOXONE 8; 2 MG/1; MG/1
FILM, SOLUBLE BUCCAL; SUBLINGUAL
Qty: 9 FILM | Refills: 0 | Status: SHIPPED | OUTPATIENT
Start: 2025-01-04

## 2025-01-31 ENCOUNTER — APPOINTMENT (OUTPATIENT)
Dept: PRIMARY CARE | Facility: CLINIC | Age: 35
End: 2025-01-31
Payer: COMMERCIAL

## 2025-01-31 VITALS — SYSTOLIC BLOOD PRESSURE: 122 MMHG | DIASTOLIC BLOOD PRESSURE: 68 MMHG

## 2025-01-31 DIAGNOSIS — F11.20 OPIOID DEPENDENCE ON AGONIST THERAPY (MULTI): ICD-10-CM

## 2025-01-31 PROCEDURE — 80305 DRUG TEST PRSMV DIR OPT OBS: CPT | Performed by: FAMILY MEDICINE

## 2025-01-31 PROCEDURE — 99214 OFFICE O/P EST MOD 30 MIN: CPT | Performed by: FAMILY MEDICINE

## 2025-01-31 RX ORDER — BUPRENORPHINE AND NALOXONE 8; 2 MG/1; MG/1
FILM, SOLUBLE BUCCAL; SUBLINGUAL
Qty: 9 FILM | Refills: 0 | Status: SHIPPED | OUTPATIENT
Start: 2025-02-01

## 2025-01-31 NOTE — PROGRESS NOTES
Chief complaint: Opioid dependence treatment  HPI: Cravings for opioids and withdrawals symptoms were controlled with current dose of buprenorphine tx. Pt denies abusing any opioids. Patient has been attending meeting/counseling as recommended. Pt felt  functional with buprenorphine tx. Pt was able to maintain good relationship with other people.  Pt had good support. Pt denies drinking eoth. No headache, constipation,  insomnia, drowsiness or confusion. Pt would have cravings and withdrawals without buprenorphine treatment. pt denies having depressed mood. No HI/SI.   PE: No acute distress, well groomed, eyes: normal pupil size, no sclera icterus, Lungs: CTA bilaterally, heart: RRR, abdomen: soft, no tenderness, BS+, good mood and normal affect. No HI/SI    A/P:   Opioid dependence, pt feels functional at daily activities while on buprenorphine treatment. Pt has been attending counseling/NA/AA meeting for opioid dependence. Pt denies any opioid abuse. Cravings and withdrawals were controlled. I personally reviewed pt's urine drug screen test done in office today. The urine drug screen was negative for opiate but + for amp and thc. I reviewed pt's OARRS report today. The OARRS report showed pt filled medications as prescribed.   No alcohol while on buprenorphine tx. I have considered the risks of abuse, dependence, addiction and diversion. I believe that it is clinically appropriate for the pt to continue current buprenorphine treatment. Continue counseling for opioid dependence. Recommend pt to keep  buprenorphine in a secure place and out of reach of children. Inform pt that lending buprenorphine is illegal. Recommend to keep narcan available in case of opioid OD. Rinse mouth after taking buprenorphine each time.  Pt should not drive or operate machinery if incoordination or confusion occurs. f/u as scheduled  dc marijuana use

## 2025-02-27 ENCOUNTER — OFFICE VISIT (OUTPATIENT)
Dept: PRIMARY CARE | Facility: CLINIC | Age: 35
End: 2025-02-27
Payer: COMMERCIAL

## 2025-02-27 VITALS — DIASTOLIC BLOOD PRESSURE: 67 MMHG | HEART RATE: 78 BPM | SYSTOLIC BLOOD PRESSURE: 132 MMHG

## 2025-02-27 DIAGNOSIS — F11.20 OPIOID DEPENDENCE ON AGONIST THERAPY (MULTI): ICD-10-CM

## 2025-02-27 PROCEDURE — 80305 DRUG TEST PRSMV DIR OPT OBS: CPT | Performed by: FAMILY MEDICINE

## 2025-02-27 PROCEDURE — 99214 OFFICE O/P EST MOD 30 MIN: CPT | Performed by: FAMILY MEDICINE

## 2025-02-27 PROCEDURE — 1036F TOBACCO NON-USER: CPT | Performed by: FAMILY MEDICINE

## 2025-02-27 RX ORDER — BUPRENORPHINE AND NALOXONE 8; 2 MG/1; MG/1
FILM, SOLUBLE BUCCAL; SUBLINGUAL
Qty: 9 FILM | Refills: 0 | Status: SHIPPED | OUTPATIENT
Start: 2025-02-27

## 2025-02-27 ASSESSMENT — PATIENT HEALTH QUESTIONNAIRE - PHQ9
2. FEELING DOWN, DEPRESSED OR HOPELESS: NOT AT ALL
SUM OF ALL RESPONSES TO PHQ9 QUESTIONS 1 AND 2: 0
1. LITTLE INTEREST OR PLEASURE IN DOING THINGS: NOT AT ALL

## 2025-02-27 NOTE — PROGRESS NOTES
Chief complaint: Opioid dependence treatment  HPI: Pt denies abusing any opioids. Patient has been attending meeting/counseling as recommended. No cravings for opioids and withdrawals symptoms. Pt felt  normal and functional. Pt denies drinking eoth. No  constipation,  insomnia, drowsiness or confusion. Pt would have cravings and withdrawals without buprenorphine treatment. pt denies having depressed mood.   PE: No acute distress, well groomed, eyes: normal pupil size, no sclera icterus, Lungs: CTA bilaterally, heart: RRR, abdomen: soft, no tenderness, BS+, no depressed mood. No HI/SI    A/P:   Opioid dependence, Cravings and withdrawals were controlled. Pt denies any opioid abuse.   pt feels normal while on buprenorphine treatment. Pt has been attending counseling/NA/AA meeting for opioid dependence. Continue counseling for opioid dependence.  I personally reviewed pt's urine drug screen test done in office today. The urine drug screen was negative for opiate but + for amp and thc.   I reviewed pt's OARRS report today. No physician shopping. Caution pt to avoid alcohol while on buprenorphine tx. I have considered the risks of abuse, dependence, addiction and diversion. I believe that it is clinically appropriate for the pt to continue current buprenorphine treatment. Recommend pt to keep  buprenorphine in a safe place. Do not share buprenorphine with other people which is illegal. Recommend to keep narcan available in case of opioid OD.   Pt should not drive or operate machinery if incoordination or confusion occurs. f/u as scheduled  Dc marijuana use. Rtc  for cpe

## 2025-02-28 ENCOUNTER — APPOINTMENT (OUTPATIENT)
Dept: PRIMARY CARE | Facility: CLINIC | Age: 35
End: 2025-02-28
Payer: COMMERCIAL

## 2025-03-25 ENCOUNTER — OFFICE VISIT (OUTPATIENT)
Dept: PRIMARY CARE | Facility: CLINIC | Age: 35
End: 2025-03-25
Payer: MEDICARE

## 2025-03-25 VITALS — SYSTOLIC BLOOD PRESSURE: 132 MMHG | DIASTOLIC BLOOD PRESSURE: 78 MMHG

## 2025-03-25 DIAGNOSIS — F11.20 OPIOID DEPENDENCE ON AGONIST THERAPY (MULTI): ICD-10-CM

## 2025-03-25 PROCEDURE — 99214 OFFICE O/P EST MOD 30 MIN: CPT | Performed by: FAMILY MEDICINE

## 2025-03-25 PROCEDURE — 80305 DRUG TEST PRSMV DIR OPT OBS: CPT | Performed by: FAMILY MEDICINE

## 2025-03-25 RX ORDER — BUPRENORPHINE AND NALOXONE 8; 2 MG/1; MG/1
FILM, SOLUBLE BUCCAL; SUBLINGUAL
Qty: 8 FILM | Refills: 0 | Status: SHIPPED | OUTPATIENT
Start: 2025-03-25

## 2025-03-25 NOTE — PROGRESS NOTES
Chief complaint: Opioid dependence treatment  HPI: Pt denies  opioids Abuse,  cravings for opioids or withdrawals symptoms. No  constipation,  insomnia or confusion. Pt would have cravings and withdrawals without buprenorphine treatment. no depressed mood.   PE: No acute distress, well groomed, eyes: normal pupil size, Lungs: CTA bilaterally, heart: RRR, abdomen: soft, no tenderness, BS+, no depressed mood. No paranoia   A/P:   Opioid dependence, no cravings and withdrawals or opioid abuse.  Pt has been attending counseling/NA/AA meeting for opioid dependence. Continue counseling for opioid dependence.  I personally reviewed pt's urine drug screen test done in office today. The urine drug screen was negative for opiate but + for amp, thc.   I reviewed pt's OARRS report today. No physician shopping.  I have considered the risks of abuse, dependence, addiction and diversion. I believe that it is clinically appropriate for the pt to continue current buprenorphine treatment. Taper dose as dir. Recommend pt to keep  buprenorphine in a safe place. buprenorphine diversion is illegal. Recommend to keep narcan available in case of opioid OD.   Pt should not drive or operate machinery if incoordination or confusion occurs. f/u as scheduled

## 2025-03-27 ENCOUNTER — APPOINTMENT (OUTPATIENT)
Dept: PRIMARY CARE | Facility: CLINIC | Age: 35
End: 2025-03-27
Payer: COMMERCIAL

## 2025-04-18 ENCOUNTER — OFFICE VISIT (OUTPATIENT)
Dept: PRIMARY CARE | Facility: CLINIC | Age: 35
End: 2025-04-18
Payer: MEDICARE

## 2025-04-18 VITALS — DIASTOLIC BLOOD PRESSURE: 78 MMHG | SYSTOLIC BLOOD PRESSURE: 129 MMHG | HEART RATE: 72 BPM

## 2025-04-18 DIAGNOSIS — F11.20 OPIOID DEPENDENCE ON AGONIST THERAPY (MULTI): ICD-10-CM

## 2025-04-18 PROCEDURE — 1036F TOBACCO NON-USER: CPT | Performed by: FAMILY MEDICINE

## 2025-04-18 PROCEDURE — 99213 OFFICE O/P EST LOW 20 MIN: CPT | Performed by: FAMILY MEDICINE

## 2025-04-18 RX ORDER — BUPRENORPHINE AND NALOXONE 8; 2 MG/1; MG/1
FILM, SOLUBLE BUCCAL; SUBLINGUAL
Qty: 8 FILM | Refills: 0 | Status: SHIPPED | OUTPATIENT
Start: 2025-04-18

## 2025-04-22 ENCOUNTER — APPOINTMENT (OUTPATIENT)
Dept: PRIMARY CARE | Facility: CLINIC | Age: 35
End: 2025-04-22
Payer: MEDICARE

## 2025-05-14 ENCOUNTER — OFFICE VISIT (OUTPATIENT)
Dept: PRIMARY CARE | Facility: CLINIC | Age: 35
End: 2025-05-14
Payer: MEDICARE

## 2025-05-14 VITALS — SYSTOLIC BLOOD PRESSURE: 121 MMHG | DIASTOLIC BLOOD PRESSURE: 66 MMHG

## 2025-05-14 DIAGNOSIS — F11.20 OPIOID DEPENDENCE ON AGONIST THERAPY (MULTI): ICD-10-CM

## 2025-05-14 PROCEDURE — 1036F TOBACCO NON-USER: CPT | Performed by: FAMILY MEDICINE

## 2025-05-14 PROCEDURE — 99214 OFFICE O/P EST MOD 30 MIN: CPT | Performed by: FAMILY MEDICINE

## 2025-05-14 PROCEDURE — 80305 DRUG TEST PRSMV DIR OPT OBS: CPT | Performed by: FAMILY MEDICINE

## 2025-05-14 RX ORDER — BUPRENORPHINE AND NALOXONE 8; 2 MG/1; MG/1
FILM, SOLUBLE BUCCAL; SUBLINGUAL
Qty: 8 FILM | Refills: 0 | Status: SHIPPED | OUTPATIENT
Start: 2025-05-14

## 2025-05-14 NOTE — PROGRESS NOTES
Chief complaint: Opioid dependence treatment  HPI: Pt denies cravings for opioids and withdrawals symptoms while on buprenorphine.  Pt denies abusing any opioids. Pt has been taking buprenorphine as dir. Pt denies drinking eoth. Pt has been attending meeting or counseling for opioid dependence.  Pt denies having headache, constipation, insomnia, drowsiness. Pt would have cravings and withdrawals without buprenorphine treatment. No depressed mood.   PE: No acute distress, well groomed, eyes: normal pupil size, no sclera icterus,  Lungs: CTA bilaterally, heart: RRR, abdomen: soft, no tenderness, normal BS, no depressed mood.   A/P: Opioid dependence, Cravings for opioids and withdrawals were controlled.   Pt denies any opioid abuse. I reviewed pt's updated OARRS report.  The OARRS report showed no opioid shopping. I reviewed pt's uds done in office today. Uds was negative for opioid but + for thc and amp.  no Eoth while on buprenorphine.  Inform pt that buprenorphine diversion is against the law. Recommend pt to keep buprenorphine out of reach of children. Recommend  counseling for opioid dependence. Pt should not drive or operate machinery if incoordination or confusion occurs. I have considered the risks of abuse, dependence, addiction and diversion. I believe that it is clinically appropriate for the pt to continue buprenorphine treatment.  Recommend pt to gently rinse teeth and gums with water after the medicine has completely dissolved. f/u as scheduled.

## 2025-05-20 ENCOUNTER — APPOINTMENT (OUTPATIENT)
Dept: PRIMARY CARE | Facility: CLINIC | Age: 35
End: 2025-05-20
Payer: MEDICARE

## 2025-06-18 ENCOUNTER — APPOINTMENT (OUTPATIENT)
Dept: PRIMARY CARE | Facility: CLINIC | Age: 35
End: 2025-06-18
Payer: MEDICARE

## 2025-06-18 VITALS — HEART RATE: 68 BPM | DIASTOLIC BLOOD PRESSURE: 66 MMHG | SYSTOLIC BLOOD PRESSURE: 126 MMHG

## 2025-06-18 DIAGNOSIS — F11.20 OPIOID DEPENDENCE ON AGONIST THERAPY (MULTI): ICD-10-CM

## 2025-06-18 PROCEDURE — 80305 DRUG TEST PRSMV DIR OPT OBS: CPT | Performed by: FAMILY MEDICINE

## 2025-06-18 PROCEDURE — 1036F TOBACCO NON-USER: CPT | Performed by: FAMILY MEDICINE

## 2025-06-18 PROCEDURE — 99214 OFFICE O/P EST MOD 30 MIN: CPT | Performed by: FAMILY MEDICINE

## 2025-06-18 RX ORDER — BUPRENORPHINE AND NALOXONE 8; 2 MG/1; MG/1
FILM, SOLUBLE BUCCAL; SUBLINGUAL
Qty: 8 FILM | Refills: 0 | Status: SHIPPED | OUTPATIENT
Start: 2025-06-18

## 2025-06-18 NOTE — PROGRESS NOTES
Chief complaint: Opioid dependence treatment  HPI: Pt is doing well. no opioids abuse. Cravings for opioids and withdrawals symptoms were controlled.   Pt denies drinking eoth. Pt has been having counseling for opioid dependence.  no constipation,  imbalance, confusion.  No depressed mood.   PE: No acute distress,  eyes: normal pupil size, no sclera icterus,  Lungs: CTA bilaterally, heart: RRR, abdomen: soft, no tenderness, normal BS, no depressed mood. No hi/si   A/P: Opioid dependence, pt denies cravings for opioids or  withdrawals.   Pt denies opioid abuse. I reviewed pt's uds done in office today.  UdS was negative for opioid but + for thc and amp. I reviewed pt's updated OARRS report.  The OARRS report showed pt filled buprenorphine as prescribed. no alcohol while on buprenorphine.  Informed the pt that buprenorphine diversion is illegal.  keep buprenorphine out of reach of children. Encourage  counseling for opioid dependence. Pt should not drive or operate machinery if incoordination or confusion occurs. I have considered the risks of abuse, dependence, addiction and diversion. I believe that it is clinically appropriate for the pt to continue buprenorphine treatment.   f/u as scheduled.   Dc marijuana use. Recommend cpe

## 2025-07-16 ENCOUNTER — APPOINTMENT (OUTPATIENT)
Dept: PRIMARY CARE | Facility: CLINIC | Age: 35
End: 2025-07-16
Payer: MEDICARE

## 2025-07-16 VITALS — SYSTOLIC BLOOD PRESSURE: 126 MMHG | DIASTOLIC BLOOD PRESSURE: 67 MMHG

## 2025-07-16 DIAGNOSIS — F11.20 OPIOID DEPENDENCE ON AGONIST THERAPY (MULTI): ICD-10-CM

## 2025-07-16 PROCEDURE — 80305 DRUG TEST PRSMV DIR OPT OBS: CPT | Performed by: FAMILY MEDICINE

## 2025-07-16 PROCEDURE — 99214 OFFICE O/P EST MOD 30 MIN: CPT | Performed by: FAMILY MEDICINE

## 2025-07-16 RX ORDER — BUPRENORPHINE AND NALOXONE 8; 2 MG/1; MG/1
FILM, SOLUBLE BUCCAL; SUBLINGUAL
Qty: 8 FILM | Refills: 0 | Status: SHIPPED | OUTPATIENT
Start: 2025-07-16

## 2025-07-16 NOTE — PROGRESS NOTES
Chief complaint: Opioid dependence treatment  HPI: Pt denies opioids abuse. Cravings for opioids and withdrawals symptoms were controlled.  Pt denies drinking eoth. Pt has been having counseling for opioid dependence.  no agitation, sweating, constipation, confusion.  No depressed mood.   PE: No acute distress,  eyes: normal pupil size, no sclera icterus,  Lungs: CTA bilaterally, heart: RRR, abdomen: soft, no tenderness, normal BS, no depressed mood.   A/P: Opioid dependence, no cravings for opioids or  withdrawals.   Pt denies opioid abuse. I reviewed pt's updated OARRS report.  The OARRS report showed pt filled buprenorphine as prescribed. no alcohol while on buprenorphine.  I reviewed pt's uds done in office today. UDS was negative for opioid but + for thc, amp.  continue  counseling for opioid dependence. Pt should not drive or operate machinery if incoordination or confusion occurs. I have considered the risks of abuse, dependence, addiction and diversion. I believe that it is clinically appropriate for the pt to continue buprenorphine treatment.   f/u as scheduled.

## 2025-08-13 ENCOUNTER — APPOINTMENT (OUTPATIENT)
Dept: PRIMARY CARE | Facility: CLINIC | Age: 35
End: 2025-08-13
Payer: MEDICARE

## 2025-08-13 VITALS — SYSTOLIC BLOOD PRESSURE: 132 MMHG | HEART RATE: 82 BPM | DIASTOLIC BLOOD PRESSURE: 67 MMHG

## 2025-08-13 DIAGNOSIS — F11.20 OPIOID DEPENDENCE ON AGONIST THERAPY (MULTI): ICD-10-CM

## 2025-08-13 PROCEDURE — 99213 OFFICE O/P EST LOW 20 MIN: CPT | Performed by: FAMILY MEDICINE

## 2025-08-13 RX ORDER — BUPRENORPHINE AND NALOXONE 8; 2 MG/1; MG/1
FILM, SOLUBLE BUCCAL; SUBLINGUAL
Qty: 8 FILM | Refills: 0 | Status: SHIPPED | OUTPATIENT
Start: 2025-08-13

## 2025-09-10 ENCOUNTER — APPOINTMENT (OUTPATIENT)
Dept: PRIMARY CARE | Facility: CLINIC | Age: 35
End: 2025-09-10
Payer: MEDICARE